# Patient Record
Sex: FEMALE | Race: WHITE | NOT HISPANIC OR LATINO | Employment: FULL TIME | URBAN - METROPOLITAN AREA
[De-identification: names, ages, dates, MRNs, and addresses within clinical notes are randomized per-mention and may not be internally consistent; named-entity substitution may affect disease eponyms.]

---

## 2017-05-21 PROCEDURE — 96372 THER/PROPH/DIAG INJ SC/IM: CPT

## 2017-05-21 PROCEDURE — 99283 EMERGENCY DEPT VISIT LOW MDM: CPT | Mod: 25

## 2017-05-22 ENCOUNTER — HOSPITAL ENCOUNTER (EMERGENCY)
Facility: HOSPITAL | Age: 47
Discharge: HOME | End: 2017-05-22
Attending: FAMILY MEDICINE
Payer: COMMERCIAL

## 2017-05-22 VITALS
TEMPERATURE: 98 F | OXYGEN SATURATION: 99 % | DIASTOLIC BLOOD PRESSURE: 70 MMHG | HEART RATE: 78 BPM | RESPIRATION RATE: 17 BRPM | WEIGHT: 136 LBS | BODY MASS INDEX: 25.7 KG/M2 | SYSTOLIC BLOOD PRESSURE: 128 MMHG

## 2017-05-22 DIAGNOSIS — D84.1 ANGIOEDEMA, HEREDITARY: Primary | ICD-10-CM

## 2017-05-22 PROCEDURE — 25000003 PHARM REV CODE 250: Performed by: FAMILY MEDICINE

## 2017-05-22 PROCEDURE — 63600175 PHARM REV CODE 636 W HCPCS: Performed by: FAMILY MEDICINE

## 2017-05-22 RX ORDER — PREDNISONE 20 MG/1
40 TABLET ORAL DAILY
Qty: 10 TABLET | Refills: 0 | Status: SHIPPED | OUTPATIENT
Start: 2017-05-22 | End: 2017-05-27

## 2017-05-22 RX ORDER — DIPHENHYDRAMINE HYDROCHLORIDE 50 MG/ML
25 INJECTION INTRAMUSCULAR; INTRAVENOUS
Status: COMPLETED | OUTPATIENT
Start: 2017-05-22 | End: 2017-05-22

## 2017-05-22 RX ADMIN — METHYLPREDNISOLONE SODIUM SUCCINATE 125 MG: 125 INJECTION, POWDER, FOR SOLUTION INTRAMUSCULAR; INTRAVENOUS at 12:05

## 2017-05-22 RX ADMIN — DIPHENHYDRAMINE HYDROCHLORIDE 25 MG: 50 INJECTION, SOLUTION INTRAMUSCULAR; INTRAVENOUS at 12:05

## 2017-05-22 RX ADMIN — RANITIDINE 150 MG: 15 SYRUP ORAL at 12:05

## 2017-05-22 NOTE — PROVIDER PROGRESS NOTES - EMERGENCY DEPT.
Encounter Date: 5/21/2017    ED Physician Progress Notes           Patient is currently resting comfortably.  No increase in symptoms.  No significant decrease in symptoms.

## 2017-05-22 NOTE — PROVIDER PROGRESS NOTES - EMERGENCY DEPT.
Encounter Date: 5/21/2017    ED Physician Progress Notes           Patient started to feel somewhat better.  No shortness of breath.  No increased swelling at all.  She would like to go home and rest and follow up with an immunologist.

## 2017-05-22 NOTE — ED PROVIDER NOTES
Encounter Date: 2017       History     Chief Complaint   Patient presents with    Oral Swelling     Review of patient's allergies indicates:   Allergen Reactions    Bextra [valdecoxib]     Latex, natural rubber      The history is provided by the patient. No  was used.   Allergic Reaction   The primary symptoms are  angioedema. The primary symptoms do not include wheezing, shortness of breath, cough, abdominal pain, nausea, vomiting, diarrhea, dizziness, palpitations, altered mental status, rash or urticaria. The current episode started several hours ago. The problem has been gradually worsening.   The angioedema began 3 - 5 hours ago. The angioedema has been gradually worsening since its onset. It is located on the tongue. The angioedema is not associated with shortness of breath or stridor.       Patient has had idiopathic angioedema for the past 4 years.  She was fine until tonight began to notice some swelling of her tongue and upper neck.  No shortness of breath nausea or vomiting.  No fever or chills.  It does hurt to swallow but no difficulty swallowing.  She has been seen by multiple physicians treated with multiple medications with minimal effect.  The wound can find a cause of her idiopathic angioedema.  She occasionally gets uvula swelling as well as her lips and face.  [place] she took 50 mg of Benadryl tonight.    Past Medical History:   Diagnosis Date    Abnormal Pap smear     Pt unknown of results.      Past Surgical History:   Procedure Laterality Date    BREAST RECONSTRUCTION      Augmentation    CERVICAL BIOPSY  W/ LOOP ELECTRODE EXCISION      Pt unknown of results.     SECTION      x2    CHOLECYSTECTOMY  2012    CRANIOTOMY      FOOT SURGERY      Right    TUBAL LIGATION       Family History   Problem Relation Age of Onset    Cancer Maternal Grandmother 70     Stomach, Throat & Brain    Cancer Maternal Grandfather 83     Renal Cancer     Cancer Father 58     Lung Cancer    Cancer Mother 29     Uterine Cancer    Ovarian cancer Neg Hx     Colon cancer Neg Hx     Breast cancer Neg Hx      Social History   Substance Use Topics    Smoking status: Never Smoker    Smokeless tobacco: Never Used    Alcohol use Yes      Comment: Socially     Review of Systems   Respiratory: Negative for cough, shortness of breath, wheezing and stridor.    Cardiovascular: Negative for palpitations.   Gastrointestinal: Negative for abdominal pain, diarrhea, nausea and vomiting.   Skin: Negative for rash.   Neurological: Negative for dizziness.       Physical Exam     Initial Vitals [05/21/17 2357]   BP Pulse Resp Temp SpO2   137/70 88 17 98.6 °F (37 °C) 99 %     Physical Exam    Nursing note and vitals reviewed.  Constitutional: She appears well-developed and well-nourished.   Patient in no acute distress.   HENT:   Head: Normocephalic and atraumatic.   Right Ear: External ear normal.   Left Ear: External ear normal.   Nose: Nose normal.   Mouth/Throat: Oropharynx is clear and moist.   No obvious swelling of her tongue.  No swelling of her uvula or posterior pharynx.   Eyes: Conjunctivae and EOM are normal. Pupils are equal, round, and reactive to light.   Neck: Normal range of motion. Neck supple.   Supple no swelling noted.   Cardiovascular: Normal rate, regular rhythm and normal heart sounds.   Pulmonary/Chest: Breath sounds normal.   Abdominal: Soft. Bowel sounds are normal.   Musculoskeletal: Normal range of motion.   Neurological: She is alert and oriented to person, place, and time. She has normal strength.   Skin: Skin is warm and dry.   Psychiatric: She has a normal mood and affect.         ED Course   Procedures  Labs Reviewed - No data to display                            ED Course     Clinical Impression:   The encounter diagnosis was Angioedema, hereditary.          Javier Keller MD  05/22/17 0155

## 2017-05-22 NOTE — ED TRIAGE NOTES
Patient c/o tongue swelling that is spreading to her throat. Moderate amount of swelling noted to tongue. Patient took Benadryl at home.

## 2017-06-07 ENCOUNTER — OFFICE VISIT (OUTPATIENT)
Dept: ALLERGY | Facility: CLINIC | Age: 47
End: 2017-06-07
Payer: COMMERCIAL

## 2017-06-07 VITALS
BODY MASS INDEX: 27.14 KG/M2 | SYSTOLIC BLOOD PRESSURE: 120 MMHG | HEIGHT: 61 IN | HEART RATE: 88 BPM | DIASTOLIC BLOOD PRESSURE: 70 MMHG | WEIGHT: 143.75 LBS

## 2017-06-07 DIAGNOSIS — R10.9 ABDOMINAL PAIN, UNSPECIFIED LOCATION: ICD-10-CM

## 2017-06-07 DIAGNOSIS — T78.3XXA ANGIOEDEMA, INITIAL ENCOUNTER: Primary | ICD-10-CM

## 2017-06-07 DIAGNOSIS — M79.7 FIBROMYALGIA: ICD-10-CM

## 2017-06-07 DIAGNOSIS — K21.9 GASTROESOPHAGEAL REFLUX DISEASE, ESOPHAGITIS PRESENCE NOT SPECIFIED: ICD-10-CM

## 2017-06-07 DIAGNOSIS — G43.909 MIGRAINE WITHOUT STATUS MIGRAINOSUS, NOT INTRACTABLE, UNSPECIFIED MIGRAINE TYPE: ICD-10-CM

## 2017-06-07 PROCEDURE — 99244 OFF/OP CNSLTJ NEW/EST MOD 40: CPT | Mod: S$GLB,,, | Performed by: ALLERGY & IMMUNOLOGY

## 2017-06-07 PROCEDURE — 99999 PR PBB SHADOW E&M-EST. PATIENT-LVL III: CPT | Mod: PBBFAC,,, | Performed by: ALLERGY & IMMUNOLOGY

## 2017-06-07 NOTE — LETTER
June 9, 2017      [place]  [address]  [address]  [place] [address]           [facility] - Allergy/ Immunology  [address]  [place] [address]  Phone: [phone]  Fax: [phone]          Patient: Sagrario Almonte   MR Number: [id number]   YOB: 1970   Date of Visit: 6/7/2017       Dear Magdalena Murillo:    Thank you for referring Sagrario Almonte to me for evaluation. Attached you will find relevant portions of my assessment and plan of care.    If you have questions, please do not hesitate to call me. I look forward to following Sagrario Almonte along with you.    Sincerely,    LIEN Perdomo III, MD    Enclosure  CC:  No Recipients    If you would like to receive this communication electronically, please contact [email or web address] or [phone] to request more information on [employer] Link access.    For providers and/or their staff who would like to refer a patient to [facility], please contact us through our one-stop-shop provider referral line, [facility], at [phone].    If you feel you have received this communication in error or would no longer like to receive these types of communications, please e-mail [email or web address]

## 2017-06-09 NOTE — PROGRESS NOTES
"Sagrario Almonte is referred by LORE Murillo for a consult regarding chronic recurrent angioedema. She is here alone. Her primary care physician is LORE Pratt.    She has had recurrent angioedema for the past 4 years. She may have angioedema of the lips, eyelids, tongue, uvula, and throat. When she has this she will take up to 7 Benadryl a day, Tagamet twice a day, and Zyrtec twice a day. The episodes usually resolve within 24-48 hours. She does not take any preventative daily medication.    She estimates that she has had over 100 episodes and 15 in 2017.    She does have an EpiPen and has used this twice in the past. The last was 3 years ago.    She has been followed in the past by Dr. Shahram Escobar, an allergist in [place]. Evaluation with him was negative. She does remember that her C1 esterase inhibitor level was normal.    She has never had any urticaria.    She denies any thyroid disease.    There is no family history of angioedema.    There is no association with any food, contact, or ingestion.    She has had recurrent abdominal pain and seen 2 gastroenterologists in the past in [place] with a negative evaluation. She describes the pain as her abdomen being "full of rocks". She does have recurrent nausea and diarrhea frequently.     She does have gastroesophageal reflux disease and has been diagnosed with esophagitis in the past.    She denies any rhinitis or conjunctivitis. She denies any cough, wheezing, or shortness of breath. She denies any history of asthma.    For ROS, FH, SH please see Allergy and Asthma Questionnaire dated today.    Some relevant pertinent positives:    Review of Systems:  She had a right internal carotid aneurysm removed in September 2006. She does have a history of migraine headaches. She has recurrent vertigo and was diagnosed with Ménière's disease by an ENT in [place]. She takes an occasional diuretic when needed. She has a history of fibromyalgia and diffuse muscle pain. She " does occasionally have palpitations.    Family History: Negative for angioedema. Her mother has rhinitis that is seasonal.    Home environment: She has lived in the same house for the past 6 months. There was no water damage. There is no evidence of mold. There is no carpeting in the bedroom. There are no pets.    Social History: She is a nonsmoker. She works as a .    Physical Examination:  General: Well-developed, well-nourished, no acute distress.  Head: No sinus tenderness.  Eyes: Conjunctivae:  No bulbar or palpebral conjunctival injection.  Ears: EAC's clear.  TM's clear.  No pre-auricular nodes.  Nose: Nasal Mucosa:  Pink.  Septum: No apparent deviation.  Turbinates:  No significant edema.  Polyps/Mass:  None visible.  Teeth/Gums:  No bleeding noted.  Oropharynx: No exudates.  Neck: Supple without thyromegaly. No cervical lymphadenopathy.    Respiratory/Chest: Effort: Good.  Auscultation:  Clear bilaterally.  Cardiovascular:  No murmur, rubs, or gallop heard.   GI:  Non-tender.  No masses.  No organomegaly.  Extremities:  No swelling.  No cyanosis, clubbing, or edema.  Skin: Good turgor.  No urticaria or angioedema.  Neuro/Psych: Oriented x 3.    Assessment:  1. Recurrent angioedema without urticaria of uncertain etiology.  2. Recurrent abdominal pain of uncertain etiology.  3. GERD.  4. Recurrent nausea and diarrhea of uncertain etiology.  5. Fibromyalgia.  6. Migraine headaches.  7. S/P right internal carotid aneurysm repair September 2006.    Recommendations:  1. Laboratory as ordered.  2. Obtain records from Dr. Escobar.  3. Consider food skin testing. She will hold antihistamines 3 days prior.  4. Journal and take pictures of all episodes.  5. EpiPen.  6. Cetirizine 10 mg twice a day for now.  7. Return to clinic in 1-2 weeks.

## 2017-06-22 ENCOUNTER — OFFICE VISIT (OUTPATIENT)
Dept: ALLERGY | Facility: CLINIC | Age: 47
End: 2017-06-22
Payer: COMMERCIAL

## 2017-06-22 VITALS
DIASTOLIC BLOOD PRESSURE: 80 MMHG | WEIGHT: 145.06 LBS | RESPIRATION RATE: 18 BRPM | SYSTOLIC BLOOD PRESSURE: 118 MMHG | BODY MASS INDEX: 27.39 KG/M2 | HEART RATE: 82 BPM | TEMPERATURE: 98 F | HEIGHT: 61 IN

## 2017-06-22 DIAGNOSIS — T78.3XXD ANGIOEDEMA, SUBSEQUENT ENCOUNTER: Primary | ICD-10-CM

## 2017-06-22 DIAGNOSIS — K21.9 GASTROESOPHAGEAL REFLUX DISEASE, ESOPHAGITIS PRESENCE NOT SPECIFIED: ICD-10-CM

## 2017-06-22 PROCEDURE — 99214 OFFICE O/P EST MOD 30 MIN: CPT | Mod: 25,S$GLB,, | Performed by: ALLERGY & IMMUNOLOGY

## 2017-06-22 PROCEDURE — 95004 PERQ TESTS W/ALRGNC XTRCS: CPT | Mod: S$GLB,,, | Performed by: ALLERGY & IMMUNOLOGY

## 2017-06-22 PROCEDURE — 99999 PR PBB SHADOW E&M-EST. PATIENT-LVL III: CPT | Mod: PBBFAC,,, | Performed by: ALLERGY & IMMUNOLOGY

## 2017-06-22 RX ORDER — DEXLANSOPRAZOLE 60 MG/1
60 CAPSULE, DELAYED RELEASE ORAL DAILY
Refills: 6 | COMMUNITY
Start: 2017-05-05

## 2017-06-22 RX ORDER — HYDROXYZINE HYDROCHLORIDE 10 MG/1
10 TABLET, FILM COATED ORAL 3 TIMES DAILY PRN
Qty: 90 TABLET | Refills: 2 | Status: SHIPPED | OUTPATIENT
Start: 2017-06-22 | End: 2018-05-23 | Stop reason: SDUPTHER

## 2017-06-22 RX ORDER — PREDNISONE 10 MG/1
TABLET ORAL
Qty: 21 TABLET | Refills: 0 | Status: SHIPPED | OUTPATIENT
Start: 2017-06-22 | End: 2020-07-27

## 2017-06-22 NOTE — PROGRESS NOTES
Sagrario Almonte returns to clinic today for continued evaluation of chronic recurrent angioedema. She is here alone. She was last seen June 7, 2017.    Since her last visit, she has not had any angioedema.    She has not had any urticaria.    She did have some mild tingling on the right side of her face today. She has not had any swelling.    She has had about 15 episodes of angioedema this year in 2017.    There has been no association with any food, contact, or ingestion.    She takes antihistamine after the event begins. She has not been taking them prophylactically.    She has not had any abdominal pain.    [facility] PEQ ALLERGY QUESTIONNAIRE SHORT 6/22/2017   Are you taking any new medications since your last visit? No   Constitution: No symptoms   Head or facial pain: Headaches   Eyes: Sensitivity to light   Ears: No symptoms   Nose: No symptoms   Throat: Itching   Sinuses: No symptoms   Lungs: No symptoms   Skin: No symptoms   Cardiovascular: No symptoms   Gastrointestinal: No symptoms   Genital/ urinary No symptoms   Musculoskeletal: Muscle pain, Joint pain   Neurologic: Headaches   Endocrine: No symptoms   Hematologic: Bruises/ bleeds easily     Physical Examination:  General: Well-developed, well-nourished, no acute distress.  Head: No sinus tenderness.  Eyes: Conjunctivae:  No bulbar or palpebral conjunctival injection.  Ears: EAC's clear.  TM's clear.  No pre-auricular nodes.  Nose: Nasal Mucosa:  Pink.  Septum: No apparent deviation.  Turbinates:  No significant edema.  Polyps/Mass:  None visible.  Teeth/Gums:  No bleeding noted.  Oropharynx: No exudates.  Neck: Supple without thyromegaly. No cervical lymphadenopathy.    Respiratory/Chest: Effort: Good.  Auscultation:  Clear bilaterally.  Skin: Good turgor.  No urticaria or angioedema.  Neuro/Psych: Oriented x 3.    Laboratory 6/7/2017:  ImmunoCAP: Negative.  C1 esterase inhibitor level: 25.  C1 esterase inhibitor functional: Greater than 90.  C3: 112.  C4:  24.  TSH: 1.194.  Thyroid peroxidase antibody level: Less than 6.0.  Thyroglobulin antibody level: Less than 4.0.  Vitamin D level: 36.  Anti-IgE receptor antibody level: 1.  Serum tryptase: 3.0.  CBC: Normal.  CMP: Glucose 55.  SPEP: Normal.    Food skin tests prick #60 6/22/2017:  3+ histamine control. All tests are negative.    Assessment:  1. Chronic recurrent urticaria and angioedema, probably idiopathic.  2. Recurrent abdominal pain of uncertain etiology, resolved.  3. GERD.  4. Recurrent nausea and diarrhea of uncertain etiology.  5. Fibromyalgia.  6. Migraine headaches.  7. S/P right internal carotid aneurysm repair September 2006.    Recommendations:  1. Cetirizine 10 mg twice a day.  2. Add hydroxyzine 10 mg at night. May increase this to 50 mg at night until controlled.  3. Prednisone if needed.  4. Consider Xolair.  5. Return to clinic in 3 weeks or sooner if needed.    Allergic mechanisms and treatment options were reviewed in detail. Urticaria and angioedema were reviewed.

## 2017-08-16 PROBLEM — E04.1 THYROID NODULE: Status: ACTIVE | Noted: 2017-08-16

## 2018-05-09 ENCOUNTER — HOSPITAL ENCOUNTER (EMERGENCY)
Facility: HOSPITAL | Age: 48
Discharge: HOME | End: 2018-05-10
Attending: SURGERY
Payer: COMMERCIAL

## 2018-05-09 VITALS
RESPIRATION RATE: 16 BRPM | TEMPERATURE: 97 F | HEART RATE: 81 BPM | DIASTOLIC BLOOD PRESSURE: 81 MMHG | HEIGHT: 61 IN | SYSTOLIC BLOOD PRESSURE: 152 MMHG | WEIGHT: 145 LBS | BODY MASS INDEX: 27.38 KG/M2 | OXYGEN SATURATION: 99 %

## 2018-05-09 DIAGNOSIS — G89.29 CHRONIC NONINTRACTABLE HEADACHE, UNSPECIFIED HEADACHE TYPE: Primary | ICD-10-CM

## 2018-05-09 DIAGNOSIS — R51.9 CHRONIC NONINTRACTABLE HEADACHE, UNSPECIFIED HEADACHE TYPE: Primary | ICD-10-CM

## 2018-05-09 PROCEDURE — 96372 THER/PROPH/DIAG INJ SC/IM: CPT

## 2018-05-09 PROCEDURE — 63600175 PHARM REV CODE 636 W HCPCS: Performed by: SURGERY

## 2018-05-09 PROCEDURE — 99283 EMERGENCY DEPT VISIT LOW MDM: CPT | Mod: 25

## 2018-05-09 RX ORDER — HYDROMORPHONE HYDROCHLORIDE 2 MG/ML
2 INJECTION, SOLUTION INTRAMUSCULAR; INTRAVENOUS; SUBCUTANEOUS
Status: COMPLETED | OUTPATIENT
Start: 2018-05-09 | End: 2018-05-09

## 2018-05-09 RX ORDER — ONDANSETRON 2 MG/ML
4 INJECTION INTRAMUSCULAR; INTRAVENOUS
Status: COMPLETED | OUTPATIENT
Start: 2018-05-09 | End: 2018-05-09

## 2018-05-09 RX ADMIN — HYDROMORPHONE HYDROCHLORIDE 2 MG: 2 INJECTION INTRAMUSCULAR; INTRAVENOUS; SUBCUTANEOUS at 11:05

## 2018-05-09 RX ADMIN — ONDANSETRON 4 MG: 2 INJECTION INTRAMUSCULAR; INTRAVENOUS at 11:05

## 2018-05-10 NOTE — ED TRIAGE NOTES
47 y.o. female presents to ER ED 02/ED 02A   Chief Complaint   Patient presents with    Headache     states having a migraine not relieved by imitrex at home   . No acute distress noted.

## 2018-05-10 NOTE — ED PROVIDER NOTES
[facility]                                                 Chief Complaint  47 y.o. female with Headache (states having a migraine not relieved by imitrex at home)    History of Present Illness  Sagrario Almonte presents to the emergency room with migraine headache this afternoon  Extensive history was taken tonight in the emergency room by the ER MCITLALIL. on HA Hx  States she's had long-standing migraine headaches, previously treated by her PCP  Patient states she recently reestablished care with a local neurologist Jose A Richmond  Patient states that she has had headaches on a daily basis for years, worse lately  Has recently gotten several tests and imaging by her neurologist for the headaches  Patient is alert and appropriate, no nausea vomiting, no fever or photophobia in the ER  Patient states her dull frontal 7/10 headache is typical of her daily migraine headaches    The history is provided by the patient  Medical history: Abnormal Pap smear  Surgeries: Breast reconstruction, cervical biopsy, , cholecystectomy, craniotomy, foot surgery, BTL  ALLERGIES: Adhesive and Bextra    Review of Systems and Physical Exam      Review of Systems  -- Constitution - no fever, denies fatigue, no weakness, no chills  -- Eyes - no tearing or redness, no visual disturbance  -- Ear, Nose - no tinnitus or earache, no nasal congestion or discharge  -- Mouth,Throat - no sore throat, no toothache, normal voice, normal swallowing  -- Respiratory - denies cough and congestion, no shortness of breath, no BRAR  -- Cardiovascular - denies chest pain, no palpitations, denies claudication  -- Gastrointestinal - denies abdominal pain, nausea, vomiting, or diarrhea  -- Genitourinary - no dysuria, no denies flank pain, no hematuria or frequency   -- Musculoskeletal - denies back pain, negative for myalgias and arthralgias   -- Neurological - headache, denies weakness or seizure; no LOC  -- Skin - denies pallor, rash, or  changes in skin. no hives or welts noted    BP (!) 152/81  Pulse 81   Temp 97 °F (36.1 °C) (Tympanic)   Resp 16     Physical Exam  -- Nursing note and vitals reviewed  -- Constitutional: Appears well-developed and well-nourished  -- Head: Atraumatic. Normocephalic. No obvious abnormality  -- Eyes: Pupils are equal and reactive to light. Normal conjunctiva and lids  -- Nose: Nose normal in appearance, nares grossly normal. No discharge  -- Throat: Mucous membranes moist, pharynx normal, normal tonsils. No lesions   -- Ears: External ears and TM normal bilaterally. Normal hearing and no drainage  -- Neck: Normal range of motion. Neck supple. No masses, trachea midline  -- Cardiac: Normal rate, regular rhythm and normal heart sounds  -- Pulmonary: Normal respiratory effort, breath sounds clear to auscultation  -- Abdominal: Soft, no tenderness. Normal bowel sounds. Normal liver edge  -- Musculoskeletal: Normal range of motion, no effusions. Joints stable   -- Neurological: No focal deficits. Showed good interaction with staff  -- Vascular: Posterior tibial, dorsalis pedis and radial pulses 2+ bilaterally      Emergency Room Course      Treatment and Evaluation  -- IM 2 mg Dilaudid given today in the ER  -- IM 4 mg Zofran given today in the ER      Diagnosis  -- Chronic nonintractable headache, unspecified headache type.    Disposition and Plan  -- Disposition: home  -- Condition: stable  -- Follow-up: Patient to follow up with Neurology in 1-2 days.  -- I advised the patient that we have found no life threatening condition today  -- At this time, I believe the patient is clinically stable for discharge.   -- The patient acknowledges that close follow up with a MD is required   -- Patient agrees to comply with all instruction and direction given in the ER    This note is dictated on Dragon Natural Speaking word recognition program.  There are word recognition mistakes that are occasionally missed on review.             Yusuf Munson MD  05/09/18 6848

## 2018-05-24 RX ORDER — HYDROXYZINE HYDROCHLORIDE 10 MG/1
10 TABLET, FILM COATED ORAL 3 TIMES DAILY PRN
Qty: 90 TABLET | Refills: 2 | Status: SHIPPED | OUTPATIENT
Start: 2018-05-24 | End: 2020-08-27 | Stop reason: SDUPTHER

## 2019-06-06 RX ORDER — HYDROXYZINE HYDROCHLORIDE 10 MG/1
10 TABLET, FILM COATED ORAL 3 TIMES DAILY PRN
Qty: 90 TABLET | Refills: 2 | OUTPATIENT
Start: 2019-06-06

## 2019-07-09 ENCOUNTER — HOSPITAL ENCOUNTER (OUTPATIENT)
Dept: RADIOLOGY | Facility: HOSPITAL | Age: 49
Discharge: HOME | End: 2019-07-09
Attending: NURSE PRACTITIONER
Payer: COMMERCIAL

## 2019-07-09 DIAGNOSIS — M79.672 PAIN IN LEFT FOOT: ICD-10-CM

## 2019-07-09 DIAGNOSIS — M79.672 PAIN IN LEFT FOOT: Primary | ICD-10-CM

## 2019-07-09 PROCEDURE — 73630 X-RAY EXAM OF FOOT: CPT | Mod: TC,LT

## 2019-12-18 ENCOUNTER — HOSPITAL ENCOUNTER (OUTPATIENT)
Dept: RADIOLOGY | Facility: HOSPITAL | Age: 49
Discharge: HOME | End: 2019-12-18
Attending: CHIROPRACTOR
Payer: COMMERCIAL

## 2019-12-18 DIAGNOSIS — M54.50 LOW BACK PAIN: ICD-10-CM

## 2019-12-18 PROCEDURE — 72148 MRI LUMBAR SPINE WITHOUT CONTRAST: ICD-10-PCS | Mod: 26,,, | Performed by: RADIOLOGY

## 2019-12-18 PROCEDURE — 72148 MRI LUMBAR SPINE W/O DYE: CPT | Mod: 26,,, | Performed by: RADIOLOGY

## 2019-12-18 PROCEDURE — 72148 MRI LUMBAR SPINE W/O DYE: CPT | Mod: TC

## 2020-01-23 ENCOUNTER — OFFICE VISIT (OUTPATIENT)
Dept: OBSTETRICS AND GYNECOLOGY | Facility: CLINIC | Age: 50
End: 2020-01-23
Payer: COMMERCIAL

## 2020-01-23 VITALS
BODY MASS INDEX: 30.06 KG/M2 | DIASTOLIC BLOOD PRESSURE: 82 MMHG | RESPIRATION RATE: 18 BRPM | HEIGHT: 61 IN | WEIGHT: 159.19 LBS | SYSTOLIC BLOOD PRESSURE: 118 MMHG | HEART RATE: 87 BPM

## 2020-01-23 DIAGNOSIS — Z12.31 ENCOUNTER FOR SCREENING MAMMOGRAM FOR BREAST CANCER: ICD-10-CM

## 2020-01-23 DIAGNOSIS — Z12.4 CERVICAL CANCER SCREENING: ICD-10-CM

## 2020-01-23 DIAGNOSIS — Z01.419 ENCOUNTER FOR GYNECOLOGICAL EXAMINATION WITHOUT ABNORMAL FINDING: Primary | ICD-10-CM

## 2020-01-23 PROCEDURE — 99386 PREV VISIT NEW AGE 40-64: CPT | Mod: S$GLB,,, | Performed by: OBSTETRICS & GYNECOLOGY

## 2020-01-23 PROCEDURE — 99999 PR PBB SHADOW E&M-EST. PATIENT-LVL III: CPT | Mod: PBBFAC,,, | Performed by: OBSTETRICS & GYNECOLOGY

## 2020-01-23 PROCEDURE — 99386 PR PREVENTIVE VISIT,NEW,40-64: ICD-10-PCS | Mod: S$GLB,,, | Performed by: OBSTETRICS & GYNECOLOGY

## 2020-01-23 PROCEDURE — 88175 CYTOPATH C/V AUTO FLUID REDO: CPT

## 2020-01-23 PROCEDURE — 99999 PR PBB SHADOW E&M-EST. PATIENT-LVL III: ICD-10-PCS | Mod: PBBFAC,,, | Performed by: OBSTETRICS & GYNECOLOGY

## 2020-01-23 RX ORDER — PROPRANOLOL HYDROCHLORIDE 10 MG/1
TABLET ORAL
COMMUNITY
End: 2023-06-22

## 2020-01-23 RX ORDER — EPINEPHRINE 0.15 MG/.3ML
0.15 INJECTION INTRAMUSCULAR
COMMUNITY
End: 2021-01-02

## 2020-01-23 RX ORDER — OXYBUTYNIN CHLORIDE 5 MG/1
5 TABLET ORAL 2 TIMES DAILY
Qty: 60 TABLET | Refills: 11 | Status: SHIPPED | OUTPATIENT
Start: 2020-01-23 | End: 2021-12-10

## 2020-01-23 RX ORDER — PHENTERMINE HYDROCHLORIDE 37.5 MG/1
TABLET ORAL
COMMUNITY
End: 2020-06-08

## 2020-01-23 RX ORDER — LEVOTHYROXINE SODIUM 25 UG/1
25 TABLET ORAL DAILY
COMMUNITY
Start: 2019-12-05

## 2020-01-23 NOTE — PROGRESS NOTES
Subjective:       Patient ID: Sagrario Almonte is a 49 y.o. female.    Chief Complaint:  Well Woman      History of Present Illness  Patient presents for annual exam.  Patient admits it is time for mammogram.  Patient admits to some urgency with incontinence of urine.  Counseling was done and patient like to trial of medication.  Patient has reported decreased libido.  She recently underwent back surgery and has been recovering.  She is otherwise without gyn complaints.    Menstrual History:  OB History        3    Para   3    Term   3            AB        Living   3       SAB        TAB        Ectopic        Multiple        Live Births   3                Menarche age:  Patient's last menstrual period was 2020 (approximate).         Review of Systems  Review of Systems   Constitutional: Positive for activity change. Negative for appetite change, chills, diaphoresis, fatigue, fever and unexpected weight change.   HENT: Positive for facial swelling. Negative for congestion, dental problem, drooling, ear discharge, ear pain, hearing loss, mouth sores, nosebleeds, postnasal drip, rhinorrhea, sinus pressure, sneezing, sore throat, tinnitus, trouble swallowing and voice change.    Eyes: Positive for photophobia and visual disturbance. Negative for pain, discharge, redness and itching.   Respiratory: Negative for apnea, cough, choking, chest tightness, shortness of breath, wheezing and stridor.    Cardiovascular: Positive for palpitations. Negative for chest pain and leg swelling.   Gastrointestinal: Negative for abdominal distention, abdominal pain, anal bleeding, blood in stool, constipation, diarrhea, nausea, rectal pain and vomiting.   Endocrine: Positive for cold intolerance. Negative for heat intolerance, polydipsia, polyphagia and polyuria.   Genitourinary: Positive for enuresis. Negative for decreased urine volume, difficulty urinating, dyspareunia, dysuria, flank pain, frequency, genital sores,  hematuria, menstrual problem, pelvic pain, urgency, vaginal bleeding, vaginal discharge and vaginal pain.   Musculoskeletal: Positive for arthralgias, back pain and myalgias. Negative for gait problem, joint swelling, neck pain and neck stiffness.   Skin: Negative for color change, pallor, rash and wound.   Allergic/Immunologic: Negative for environmental allergies, food allergies and immunocompromised state.   Neurological: Positive for headaches. Negative for dizziness, tremors, seizures, syncope, facial asymmetry, speech difficulty, weakness, light-headedness and numbness.   Hematological: Negative for adenopathy. Does not bruise/bleed easily.   Psychiatric/Behavioral: Negative for agitation, behavioral problems, confusion, decreased concentration, dysphoric mood, hallucinations, self-injury, sleep disturbance and suicidal ideas. The patient is not nervous/anxious and is not hyperactive.            Objective:      Physical Exam   Constitutional: She is oriented to person, place, and time. She appears well-developed and well-nourished.   Neck: No thyromegaly present.   Cardiovascular: Normal rate and regular rhythm.   Pulmonary/Chest: Effort normal and breath sounds normal. Right breast exhibits no inverted nipple, no mass, no nipple discharge, no skin change and no tenderness. Left breast exhibits no inverted nipple, no mass, no nipple discharge, no skin change and no tenderness. No breast tenderness or discharge. Breasts are symmetrical.   Abdominal: Soft. Bowel sounds are normal. She exhibits no mass. There is no tenderness. Hernia confirmed negative in the right inguinal area and confirmed negative in the left inguinal area.   Genitourinary: Vagina normal and uterus normal. Rectal exam shows no external hemorrhoid. No breast tenderness or discharge. Uterus is not enlarged and not tender. Cervix exhibits no motion tenderness, no discharge and no friability. Right adnexum displays no mass, no tenderness and no  fullness. Left adnexum displays no mass, no tenderness and no fullness. No tenderness in the vagina. No foreign body in the vagina. No vaginal discharge found.   Musculoskeletal: Normal range of motion.   Lymphadenopathy:        Right: No inguinal adenopathy present.        Left: No inguinal adenopathy present.   Neurological: She is alert and oriented to person, place, and time. She has normal reflexes.   Skin: Skin is dry.   Psychiatric: She has a normal mood and affect. Her behavior is normal. Judgment and thought content normal.   Nursing note and vitals reviewed.          Assessment:        1. Encounter for gynecological examination without abnormal finding    2. Encounter for screening mammogram for breast cancer    3. Cervical cancer screening        Decreased libido        Plan:         Sagrario was seen today for well woman.    Diagnoses and all orders for this visit:    Encounter for gynecological examination without abnormal finding    Encounter for screening mammogram for breast cancer  -     Mammo Digital Screening Bilat w/ Jairo; Future    Cervical cancer screening  -     Liquid-Based Pap Smear, Screening

## 2020-01-23 NOTE — LETTER
January 23, 2020      [place]  [address]  [facility] [address]           [facility] - OB/ GYN  [address] [address]  Phone: [phone]          Patient: Sagrario Almonte   MR Number: [id number]   YOB: 1970   Date of Visit: 1/23/2020       Dear Shania Quan:    Thank you for referring Sagrario Almonte to me for evaluation. Attached you will find relevant portions of my assessment and plan of care.    If you have questions, please do not hesitate to call me. I look forward to following Sagrario Almonte along with you.    Sincerely,    Venkatesh Lee MD    Enclosure  CC:  No Recipients    If you would like to receive this communication electronically, please contact [email or web address] or [phone] to request more information on [employer] Link access.    For providers and/or their staff who would like to refer a patient to [facility], please contact us through our one-stop-shop provider referral line, [facility], at [phone].    If you feel you have received this communication in error or would no longer like to receive these types of communications, please e-mail [email or web address]

## 2020-02-06 ENCOUNTER — HOSPITAL ENCOUNTER (OUTPATIENT)
Dept: RADIOLOGY | Facility: HOSPITAL | Age: 50
Discharge: HOME | End: 2020-02-06
Attending: OBSTETRICS & GYNECOLOGY
Payer: COMMERCIAL

## 2020-02-06 VITALS — BODY MASS INDEX: 30.02 KG/M2 | WEIGHT: 159 LBS | HEIGHT: 61 IN

## 2020-02-06 DIAGNOSIS — Z12.31 ENCOUNTER FOR SCREENING MAMMOGRAM FOR BREAST CANCER: ICD-10-CM

## 2020-02-06 PROCEDURE — 77067 SCR MAMMO BI INCL CAD: CPT | Mod: TC

## 2020-02-06 PROCEDURE — 77067 SCR MAMMO BI INCL CAD: CPT | Mod: 26,,, | Performed by: RADIOLOGY

## 2020-02-06 PROCEDURE — 77063 BREAST TOMOSYNTHESIS BI: CPT | Mod: 26,,, | Performed by: RADIOLOGY

## 2020-02-06 PROCEDURE — 77067 MAMMO DIGITAL SCREENING BILAT WITH TOMOSYNTHESIS_CAD: ICD-10-PCS | Mod: 26,,, | Performed by: RADIOLOGY

## 2020-02-06 PROCEDURE — 77063 MAMMO DIGITAL SCREENING BILAT WITH TOMOSYNTHESIS_CAD: ICD-10-PCS | Mod: 26,,, | Performed by: RADIOLOGY

## 2020-02-13 LAB
FINAL PATHOLOGIC DIAGNOSIS: NORMAL
Lab: NORMAL

## 2020-03-19 ENCOUNTER — TELEPHONE (OUTPATIENT)
Dept: OBSTETRICS AND GYNECOLOGY | Facility: CLINIC | Age: 50
End: 2020-03-19

## 2020-03-19 NOTE — TELEPHONE ENCOUNTER
----- Message from Kori Todd MA sent at 3/19/2020  1:30 PM CDT -----  Contact: self  Sagrario Almonte  MRN: [id number]  Home Phone      [phone]  Work Phone      Not on file.  Mobile          [phone]    Patient Care Team:  Yadi Pratt MD as PCP - General (Family Medicine)  OB? No  What phone number can you be reached at?  [phone]  Message:   Needs letter on [facility] letter head stating when she had her annual and mammo done this year for insurance reasons.

## 2020-06-08 ENCOUNTER — OFFICE VISIT (OUTPATIENT)
Dept: OBSTETRICS AND GYNECOLOGY | Facility: CLINIC | Age: 50
End: 2020-06-08
Payer: COMMERCIAL

## 2020-06-08 VITALS
HEIGHT: 61 IN | HEART RATE: 72 BPM | WEIGHT: 165 LBS | BODY MASS INDEX: 31.15 KG/M2 | DIASTOLIC BLOOD PRESSURE: 68 MMHG | SYSTOLIC BLOOD PRESSURE: 136 MMHG

## 2020-06-08 DIAGNOSIS — F32.9 REACTIVE DEPRESSION: Primary | ICD-10-CM

## 2020-06-08 PROCEDURE — 99213 PR OFFICE/OUTPT VISIT, EST, LEVL III, 20-29 MIN: ICD-10-PCS | Mod: S$GLB,,, | Performed by: OBSTETRICS & GYNECOLOGY

## 2020-06-08 PROCEDURE — 99999 PR PBB SHADOW E&M-EST. PATIENT-LVL III: CPT | Mod: PBBFAC,,, | Performed by: OBSTETRICS & GYNECOLOGY

## 2020-06-08 PROCEDURE — 99999 PR PBB SHADOW E&M-EST. PATIENT-LVL III: ICD-10-PCS | Mod: PBBFAC,,, | Performed by: OBSTETRICS & GYNECOLOGY

## 2020-06-08 PROCEDURE — 99213 OFFICE O/P EST LOW 20 MIN: CPT | Mod: S$GLB,,, | Performed by: OBSTETRICS & GYNECOLOGY

## 2020-06-08 RX ORDER — PHENTERMINE HYDROCHLORIDE 37.5 MG/1
37.5 TABLET ORAL
Qty: 30 TABLET | Refills: 1 | Status: SHIPPED | OUTPATIENT
Start: 2020-06-08 | End: 2020-06-08

## 2020-06-08 RX ORDER — CITALOPRAM 20 MG/1
20 TABLET, FILM COATED ORAL DAILY
Qty: 30 TABLET | Refills: 12 | Status: SHIPPED | OUTPATIENT
Start: 2020-06-08 | End: 2023-06-22

## 2020-06-08 NOTE — PROGRESS NOTES
Subjective:       Patient ID: Sagrario Almonte is a 49 y.o. female.    Chief Complaint:  Mood Swings (stopped taking medication that neurologist put her on, for appr 10 yrs. ) and Anxiety (crying, angry for no reason)      History of Present Illness  Patient presents complaining depression with some associated anxiety.  Patient admits to crying spells and difficulty sleeping.  Her appetite is decreased.  Patient states that symptoms be getting worse over the last few months.  Patient states she was on Medicine a little over 10 years ago which helped.  Patient is interested in restarting this medication.  Counseling was done.  Counseling lasted approximately 15 min period    Menstrual History:  OB History        3    Para   3    Term   3            AB        Living   3       SAB        TAB        Ectopic        Multiple        Live Births   3                Menarche age:  Patient's last menstrual period was 2020 (approximate).         Review of Systems  Review of Systems   Constitutional: Negative for activity change, appetite change, chills, diaphoresis, fatigue, fever and unexpected weight change.   HENT: Negative for congestion, dental problem, drooling, ear discharge, ear pain, facial swelling, hearing loss, mouth sores, nosebleeds, postnasal drip, rhinorrhea, sinus pressure, sneezing, sore throat, tinnitus, trouble swallowing and voice change.    Eyes: Negative for photophobia, pain, discharge, redness, itching and visual disturbance.   Respiratory: Negative for apnea, cough, choking, chest tightness, shortness of breath, wheezing and stridor.    Cardiovascular: Negative for chest pain, palpitations and leg swelling.   Gastrointestinal: Negative for abdominal distention, abdominal pain, anal bleeding, blood in stool, constipation, diarrhea, nausea, rectal pain and vomiting.   Endocrine: Negative for cold intolerance, heat intolerance, polydipsia, polyphagia and polyuria.   Genitourinary:  Negative for decreased urine volume, difficulty urinating, dyspareunia, dysuria, enuresis, flank pain, frequency, genital sores, hematuria, menstrual problem, pelvic pain, urgency, vaginal bleeding, vaginal discharge and vaginal pain.   Musculoskeletal: Negative for arthralgias, back pain, gait problem, joint swelling, myalgias, neck pain and neck stiffness.   Skin: Negative for color change, pallor, rash and wound.   Allergic/Immunologic: Negative for environmental allergies, food allergies and immunocompromised state.   Neurological: Negative for dizziness, tremors, seizures, syncope, facial asymmetry, speech difficulty, weakness, light-headedness, numbness and headaches.   Hematological: Negative for adenopathy. Does not bruise/bleed easily.   Psychiatric/Behavioral: Positive for agitation and sleep disturbance. Negative for behavioral problems, confusion, decreased concentration, dysphoric mood, hallucinations, self-injury and suicidal ideas. The patient is nervous/anxious. The patient is not hyperactive.            Objective:      Physical Exam   Psychiatric: Her speech is normal. Judgment normal. She is slowed. She exhibits a depressed mood. She expresses no homicidal and no suicidal ideation.   Nursing note and vitals reviewed.          Assessment:        1. Reactive depression                Plan:         Sagrario was seen today for mood swings and anxiety.    Diagnoses and all orders for this visit:    Reactive depression    Other orders  -     Discontinue: phentermine (ADIPEX-P) 37.5 mg tablet; Take 1 tablet (37.5 mg total) by mouth before breakfast.  -     citalopram (CELEXA) 20 MG tablet; Take 1 tablet (20 mg total) by mouth once daily.

## 2020-07-19 ENCOUNTER — HOSPITAL ENCOUNTER (EMERGENCY)
Facility: HOSPITAL | Age: 50
Discharge: HOME | End: 2020-07-19
Attending: SURGERY
Payer: COMMERCIAL

## 2020-07-19 VITALS
DIASTOLIC BLOOD PRESSURE: 77 MMHG | BODY MASS INDEX: 30.89 KG/M2 | WEIGHT: 163.5 LBS | TEMPERATURE: 98 F | SYSTOLIC BLOOD PRESSURE: 138 MMHG | OXYGEN SATURATION: 97 % | HEART RATE: 104 BPM | RESPIRATION RATE: 20 BRPM

## 2020-07-19 DIAGNOSIS — Z20.822 CLOSE EXPOSURE TO COVID-19 VIRUS: Primary | ICD-10-CM

## 2020-07-19 LAB — SARS-COV-2 RDRP RESP QL NAA+PROBE: NEGATIVE

## 2020-07-19 PROCEDURE — U0002 COVID-19 LAB TEST NON-CDC: HCPCS

## 2020-07-19 PROCEDURE — 99283 EMERGENCY DEPT VISIT LOW MDM: CPT

## 2020-07-19 RX ORDER — AZITHROMYCIN 250 MG/1
TABLET, FILM COATED ORAL
Qty: 6 TABLET | Refills: 0 | Status: SHIPPED | OUTPATIENT
Start: 2020-07-19 | End: 2023-06-22

## 2020-07-19 NOTE — Clinical Note
"Sagrario "Gerardo Almonte was seen and treated in our emergency department on 7/19/2020.     COVID-19 is present in our communities across the state. There is limited testing for COVID at this time, so not all patients can be tested. In this situation, your employee meets the following criteria:    Sagrario Almonte has met the criteria for COVID-19 testing and has a POSITIVE result. She can return to work once they are asymptomatic for 72 hours without the use of fever reducing medications AND at least ten days from the first positive result.     If you have any questions or concerns, or if I can be of further assistance, please do not hesitate to contact me.    Sincerely,              RN"

## 2020-07-19 NOTE — Clinical Note
"Sagrario Almonte (Anita) was seen and treated in our emergency department on 7/19/2020.     COVID-19 is present in our communities across the state. There is limited testing for COVID at this time, so not all patients can be tested. In this situation, your employee meets the following criteria:    Sagrario Almonte has met the criteria for COVID-19 testing and has a NEGATIVE result. The employee can return to work once they are asymptomatic for 72 hours without the use of fever reducing medications (Tylenol, Motrin, etc).     If you have any questions or concerns, or if I can be of further assistance, please do not hesitate to contact me.    Sincerely,             SOURAV Traylor RN RN"

## 2020-07-20 NOTE — ED TRIAGE NOTES
50 y.o. female presents to ER RWR 01/RWR 01   Chief Complaint   Patient presents with    COVID-19 Concerns     nausea, sneezing, headache X 2 weeks, Covid exposure   . No acute distress noted.

## 2020-07-20 NOTE — ED PROVIDER NOTES
[facility]                                                 Chief Complaint  50 y.o. female with COVID-19 Concerns (nausea, sneezing, headache X 2 weeks, Covid exposure)      History of Present Illness  Sagrario Almonte presents to the emergency room with COVID concerns today  Patient with clear nasal drainage with nasal mucosa erythema noted this a.m.  Pt states that they have been in contact with several people with COVID virus  Clear lung sounds with no wheezing or sputum identified on evaluation today  ROS in the ER today shows no signs or symptoms suspicious for coronavirus     The history is provided by the patient  Medical history: Abnormal Pap smear  Surgeries: Breast reconstruction, cervical biopsy, , cholecystectomy, craniotomy, foot surgery, BTL  ALLERGIES: Adhesive and Bextra    I have reviewed all of this patient's past medical, surgical, family, and social   histories as well as active allergies and medications documented in the  electronic medical record    Review of Systems and Physical Exam      Review of Systems  -- Constitution - no fever, denies fatigue, no weakness, no chills  -- Eyes - no tearing or redness, no visual disturbance  -- Ear, Nose - sneezing, nasal congestion and clear discharge   -- Mouth,Throat - sore throat, no toothache, normal voice, normal swallowing  -- Respiratory - cough and congestion, no shortness of breath, no BRAR  -- Cardiovascular - denies chest pain, no palpitations, denies claudication  -- Gastrointestinal - denies abdominal pain, nausea, vomiting, or diarrhea  -- Genitourinary - no dysuria, no hematuria, no flank pain, no bladder pain  -- Musculoskeletal - denies back pain, negative for trauma or injury  -- Neurological - no headache, denies weakness or seizure; no LOC  -- Skin - denies pallor, rash, or changes in skin. no hives or welts noted     Vital Signs  Her oral temperature is 98.4 °F (36.9 °C).   Her blood pressure is 138/77 and  her pulse is 104.   Her respiration is 20 and oxygen saturation is 97%.     Physical Exam  -- Nursing note and vitals reviewed  -- Constitutional: Appears well-developed and well-nourished  -- Head: Atraumatic. Normocephalic. No obvious abnormality  -- Eyes: Pupils are equal and reactive to light. Normal conjunctiva and lids  -- Nose: nasal mucosa erythema and edema; clear nasal discharge noted   -- Throat: post-nasal drip with mild posterior oropharnyx erythema  -- Ears: External ears and TM normal bilaterally. Normal hearing and no drainage  -- Neck: Normal range of motion. Neck supple. No masses, trachea midline  -- Cardiac: Normal rate, regular rhythm and normal heart sounds  -- Pulmonary: Normal respiratory effort, breath sounds clear to auscultation  -- Abdominal: Soft, no tenderness. Normal bowel sounds. Normal liver edge  -- Musculoskeletal: Normal range of motion, no effusions. Joints stable   -- Neurological: No focal deficits. Showed good interaction with staff  -- Vascular: Posterior tibial, dorsalis pedis and radial pulses 2+ bilaterally       Emergency Room Course      Treatment and Evaluation  -- rapid Coronavirus PCR was negative    Diagnosis  [Z20.828] Close Exposure to Covid-19 Virus (Primary)    Disposition and Plan  -- Disposition: home  -- Condition: stable  -- Follow-up: Patient to follow up with Yadi Pratt MD in 1-2 days.  -- I advised the patient that we have found no life threatening condition today  -- At this time, I believe the patient is clinically stable for discharge.   -- The patient acknowledges that close follow up with a MD is required   -- Patient agrees to comply with all instruction and direction given in the ER    This note is dictated on M*Modal word recognition program.  There are word recognition mistakes that are occasionally missed on review.         Yusuf Munson MD  07/19/20 2045

## 2020-07-27 ENCOUNTER — HOSPITAL ENCOUNTER (EMERGENCY)
Facility: HOSPITAL | Age: 50
Discharge: HOME | End: 2020-07-27
Attending: EMERGENCY MEDICINE
Payer: COMMERCIAL

## 2020-07-27 VITALS
RESPIRATION RATE: 18 BRPM | DIASTOLIC BLOOD PRESSURE: 92 MMHG | SYSTOLIC BLOOD PRESSURE: 135 MMHG | OXYGEN SATURATION: 100 % | TEMPERATURE: 99 F | WEIGHT: 164.81 LBS | HEART RATE: 82 BPM | BODY MASS INDEX: 31.14 KG/M2

## 2020-07-27 DIAGNOSIS — Z20.822 SUSPECTED COVID-19 VIRUS INFECTION: Primary | ICD-10-CM

## 2020-07-27 DIAGNOSIS — R05.8 PRODUCTIVE COUGH: ICD-10-CM

## 2020-07-27 LAB
ALBUMIN SERPL BCP-MCNC: 4.1 G/DL (ref 3.5–5.2)
ALP SERPL-CCNC: 70 U/L (ref 55–135)
ALT SERPL W/O P-5'-P-CCNC: 13 U/L (ref 10–44)
ANION GAP SERPL CALC-SCNC: 9 MMOL/L (ref 8–16)
AST SERPL-CCNC: 17 U/L (ref 10–40)
BASOPHILS # BLD AUTO: 0.03 K/UL (ref 0–0.2)
BASOPHILS NFR BLD: 0.5 % (ref 0–1.9)
BILIRUB SERPL-MCNC: 0.5 MG/DL (ref 0.1–1)
BNP SERPL-MCNC: <10 PG/ML (ref 0–99)
BUN SERPL-MCNC: 9 MG/DL (ref 6–20)
CALCIUM SERPL-MCNC: 9.2 MG/DL (ref 8.7–10.5)
CHLORIDE SERPL-SCNC: 106 MMOL/L (ref 95–110)
CK SERPL-CCNC: 77 U/L (ref 20–180)
CO2 SERPL-SCNC: 28 MMOL/L (ref 23–29)
CREAT SERPL-MCNC: 0.9 MG/DL (ref 0.5–1.4)
D DIMER PPP IA.FEU-MCNC: 0.42 MG/L FEU
DIFFERENTIAL METHOD: ABNORMAL
EOSINOPHIL # BLD AUTO: 0.1 K/UL (ref 0–0.5)
EOSINOPHIL NFR BLD: 1.7 % (ref 0–8)
ERYTHROCYTE [DISTWIDTH] IN BLOOD BY AUTOMATED COUNT: 12.8 % (ref 11.5–14.5)
EST. GFR  (AFRICAN AMERICAN): >60 ML/MIN/1.73 M^2
EST. GFR  (NON AFRICAN AMERICAN): >60 ML/MIN/1.73 M^2
GLUCOSE SERPL-MCNC: 76 MG/DL (ref 70–110)
HCT VFR BLD AUTO: 42.1 % (ref 37–48.5)
HGB BLD-MCNC: 13.8 G/DL (ref 12–16)
IMM GRANULOCYTES # BLD AUTO: 0.01 K/UL (ref 0–0.04)
IMM GRANULOCYTES NFR BLD AUTO: 0.2 % (ref 0–0.5)
LACTATE SERPL-SCNC: 1.4 MMOL/L (ref 0.5–2.2)
LDH SERPL L TO P-CCNC: 174 U/L (ref 110–260)
LYMPHOCYTES # BLD AUTO: 2.3 K/UL (ref 1–4.8)
LYMPHOCYTES NFR BLD: 35.6 % (ref 18–48)
MCH RBC QN AUTO: 31.4 PG (ref 27–31)
MCHC RBC AUTO-ENTMCNC: 32.8 G/DL (ref 32–36)
MCV RBC AUTO: 96 FL (ref 82–98)
MONOCYTES # BLD AUTO: 0.5 K/UL (ref 0.3–1)
MONOCYTES NFR BLD: 7.2 % (ref 4–15)
NEUTROPHILS # BLD AUTO: 3.5 K/UL (ref 1.8–7.7)
NEUTROPHILS NFR BLD: 54.8 % (ref 38–73)
NRBC BLD-RTO: 0 /100 WBC
PLATELET # BLD AUTO: 234 K/UL (ref 150–350)
PMV BLD AUTO: 11 FL (ref 9.2–12.9)
POTASSIUM SERPL-SCNC: 4.1 MMOL/L (ref 3.5–5.1)
PROCALCITONIN SERPL IA-MCNC: <0.02 NG/ML
PROT SERPL-MCNC: 7.1 G/DL (ref 6–8.4)
RBC # BLD AUTO: 4.39 M/UL (ref 4–5.4)
SARS-COV-2 RDRP RESP QL NAA+PROBE: NEGATIVE
SODIUM SERPL-SCNC: 143 MMOL/L (ref 136–145)
TROPONIN I SERPL DL<=0.01 NG/ML-MCNC: 0.02 NG/ML (ref 0–0.03)
WBC # BLD AUTO: 6.37 K/UL (ref 3.9–12.7)

## 2020-07-27 PROCEDURE — 84145 PROCALCITONIN (PCT): CPT

## 2020-07-27 PROCEDURE — 82550 ASSAY OF CK (CPK): CPT

## 2020-07-27 PROCEDURE — 93010 ELECTROCARDIOGRAM REPORT: CPT | Mod: ,,, | Performed by: INTERNAL MEDICINE

## 2020-07-27 PROCEDURE — 83880 ASSAY OF NATRIURETIC PEPTIDE: CPT

## 2020-07-27 PROCEDURE — 99285 EMERGENCY DEPT VISIT HI MDM: CPT | Mod: 25

## 2020-07-27 PROCEDURE — 87040 BLOOD CULTURE FOR BACTERIA: CPT

## 2020-07-27 PROCEDURE — 85379 FIBRIN DEGRADATION QUANT: CPT

## 2020-07-27 PROCEDURE — 83605 ASSAY OF LACTIC ACID: CPT

## 2020-07-27 PROCEDURE — 84484 ASSAY OF TROPONIN QUANT: CPT

## 2020-07-27 PROCEDURE — 82728 ASSAY OF FERRITIN: CPT

## 2020-07-27 PROCEDURE — 85025 COMPLETE CBC W/AUTO DIFF WBC: CPT

## 2020-07-27 PROCEDURE — 36415 COLL VENOUS BLD VENIPUNCTURE: CPT

## 2020-07-27 PROCEDURE — 93005 ELECTROCARDIOGRAM TRACING: CPT

## 2020-07-27 PROCEDURE — 83615 LACTATE (LD) (LDH) ENZYME: CPT

## 2020-07-27 PROCEDURE — 80053 COMPREHEN METABOLIC PANEL: CPT

## 2020-07-27 PROCEDURE — 86140 C-REACTIVE PROTEIN: CPT

## 2020-07-27 PROCEDURE — 93010 EKG 12-LEAD: ICD-10-PCS | Mod: ,,, | Performed by: INTERNAL MEDICINE

## 2020-07-27 PROCEDURE — U0002 COVID-19 LAB TEST NON-CDC: HCPCS

## 2020-07-27 RX ORDER — MONTELUKAST SODIUM 10 MG/1
10 TABLET ORAL NIGHTLY
Qty: 30 TABLET | Refills: 0 | Status: SHIPPED | OUTPATIENT
Start: 2020-07-27 | End: 2020-08-26

## 2020-07-27 RX ORDER — METHYLPREDNISOLONE 4 MG/1
TABLET ORAL
Qty: 1 PACKAGE | Refills: 0 | Status: SHIPPED | OUTPATIENT
Start: 2020-07-27 | End: 2020-08-17

## 2020-07-27 RX ORDER — LEVOFLOXACIN 500 MG/1
500 TABLET, FILM COATED ORAL DAILY
Qty: 5 TABLET | Refills: 0 | Status: SHIPPED | OUTPATIENT
Start: 2020-07-27 | End: 2020-08-01

## 2020-07-27 RX ORDER — ALBUTEROL SULFATE 90 UG/1
1-2 AEROSOL, METERED RESPIRATORY (INHALATION) EVERY 6 HOURS PRN
Qty: 6.7 G | Refills: 0 | Status: SHIPPED | OUTPATIENT
Start: 2020-07-27 | End: 2023-06-22

## 2020-07-27 RX ORDER — PROMETHAZINE HYDROCHLORIDE AND DEXTROMETHORPHAN HYDROBROMIDE 6.25; 15 MG/5ML; MG/5ML
5 SYRUP ORAL EVERY 6 HOURS PRN
Qty: 100 ML | Refills: 0 | Status: SHIPPED | OUTPATIENT
Start: 2020-07-27 | End: 2023-06-22

## 2020-07-27 NOTE — ED NOTES
Patient provided D/C instructions at the bedside.  Patient was provided the opportunity to review D/C summary and diagnosis.  Patient has no further questions or concerns in regards to treatment/care they have received today in the emergency department.  Patient acknowledged discharge teachings and follow-up appointment as directed. Patient educated on COVID-19 discharge instructions and importance of adhering to isolation as well as when to seek medical attention. Patient verbalizes understanding.   Patient had steady gait while exiting the emergency department and left with mask in use.

## 2020-07-27 NOTE — DISCHARGE INSTRUCTIONS
**Follow up with PCP in 24-48 hours. Via telemedicine Return to ER with worsening of symptoms.     **Over the counter tylenol or motrin as needed for pain and/or fever as directed on package insert. Drink plenty fluids. Get plenty rest. Wash hands frequently.     **Our goal in the emergency department is to always give you outstanding care and exceptional service. You may receive a survey by mail or e-mail in the next week regarding your experience in our ED. We would greatly appreciate your completing and returning the survey. Your feedback provides us with a way to recognize our staff who give very good care and it helps us learn how to improve when your experience was below our aspiration of excellence.

## 2020-07-27 NOTE — ED TRIAGE NOTES
50 y.o. female presents to ER   Chief Complaint   Patient presents with    COVID-19 Concerns     exposure to covid 3 positives in home, symptoms of cold chills, congestion   . No acute distress noted.

## 2020-07-27 NOTE — ED PROVIDER NOTES
Encounter Date: 2020       History     Chief Complaint   Patient presents with    COVID-19 Concerns     exposure to covid 3 positives in home, symptoms of cold chills, congestion     Sagrario Almonte is a 50 y.o. female with PMH of fibromyalgia, migraine, thyroid disease who presents to the ED with COVID-19 concerns.  Patient reports that 3 of her family members who live in the same household have recently test positive for COVID-19.  She presents with a 2 day history of generalized fatigue, nasal congestion, and cough.  Nonproductive, dry cough.  She denies associated chest pain or shortness of breath.  She does not endorse fever, chills, or body aches.  She was recently seen in the ED on  with a negative COVID test.  She presents requesting testing again due to symptoms and exposure.      The history is provided by the patient.     Review of patient's allergies indicates:   Allergen Reactions    Adhesive     Bextra [valdecoxib]      Past Medical History:   Diagnosis Date    Abnormal Pap smear     Pt unknown of results.     Abnormal Pap smear of cervix     Fibromyalgia     Migraines     Thyroid disease      Past Surgical History:   Procedure Laterality Date    BREAST RECONSTRUCTION      Augmentation    CERVICAL BIOPSY  W/ LOOP ELECTRODE EXCISION      Pt unknown of results.     SECTION      x2    CHOLECYSTECTOMY  2012    CRANIOTOMY      FOOT SURGERY      Right    TUBAL LIGATION       Family History   Problem Relation Age of Onset    Cancer Maternal Grandmother 70        Stomach, Throat & Brain    Cancer Maternal Grandfather 83        Renal Cancer    Cancer Father 58        Lung Cancer    Cancer Mother 29        Uterine Cancer    Ovarian cancer Neg Hx     Colon cancer Neg Hx     Breast cancer Neg Hx      Social History     Tobacco Use    Smoking status: Never Smoker    Smokeless tobacco: Never Used   Substance Use Topics    Alcohol use: Yes     Comment: Socially     Drug use: No     Review of Systems   Constitutional: Positive for fatigue. Negative for activity change, chills and fever.   HENT: Positive for congestion and postnasal drip. Negative for ear discharge, ear pain, sinus pressure, sinus pain and sore throat.    Eyes: Negative.    Respiratory: Positive for cough. Negative for chest tightness, shortness of breath and wheezing.    Cardiovascular: Negative.  Negative for chest pain.   Gastrointestinal: Negative.  Negative for abdominal distention, abdominal pain and nausea.   Endocrine: Negative.    Genitourinary: Negative.  Negative for dysuria, frequency and urgency.   Musculoskeletal: Negative.  Negative for back pain.   Skin: Negative.  Negative for rash.   Allergic/Immunologic: Negative.    Neurological: Negative.  Negative for dizziness, weakness, light-headedness and numbness.   Hematological: Negative.  Does not bruise/bleed easily.   Psychiatric/Behavioral: Negative.        Physical Exam     Initial Vitals [07/27/20 1242]   BP Pulse Resp Temp SpO2   133/78 92 18 96.7 °F (35.9 °C) 100 %      MAP       --         Physical Exam    Nursing note and vitals reviewed.  Constitutional: She appears well-developed and well-nourished.   HENT:   Head: Normocephalic and atraumatic.   Right Ear: Tympanic membrane, external ear and ear canal normal. Tympanic membrane is not erythematous. No middle ear effusion.   Left Ear: Tympanic membrane, external ear and ear canal normal. Tympanic membrane is not erythematous.  No middle ear effusion.   Nose: Mucosal edema and rhinorrhea present.   Mouth/Throat: Uvula is midline and mucous membranes are normal. Mucous membranes are not pale and not dry. Posterior oropharyngeal erythema (+ PND) present.   Eyes: Conjunctivae and EOM are normal. Pupils are equal, round, and reactive to light.   Neck: Normal range of motion. Neck supple.   Cardiovascular: Normal rate, regular rhythm, normal heart sounds and intact distal pulses.    Pulmonary/Chest: Effort normal and breath sounds normal. No bradypnea. No respiratory distress. She has no decreased breath sounds. She has no wheezes. She has no rhonchi. She has no rales.   Abdominal: Soft. Bowel sounds are normal. There is no abdominal tenderness.   Musculoskeletal: Normal range of motion.   Neurological: She is alert and oriented to person, place, and time. She has normal strength. She displays normal reflexes. No cranial nerve deficit or sensory deficit.   Skin: Skin is warm and dry. Capillary refill takes less than 2 seconds. No rash noted.   Psychiatric: She has a normal mood and affect. Her behavior is normal. Judgment and thought content normal.         ED Course   Procedures  Labs Reviewed   CBC W/ AUTO DIFFERENTIAL - Abnormal; Notable for the following components:       Result Value    Mean Corpuscular Hemoglobin 31.4 (*)     All other components within normal limits   CULTURE, BLOOD   CULTURE, BLOOD   COMPREHENSIVE METABOLIC PANEL   LACTATE DEHYDROGENASE   CK   LACTIC ACID, PLASMA   TROPONIN I   PROCALCITONIN   B-TYPE NATRIURETIC PEPTIDE   D DIMER, QUANTITATIVE   SARS-COV-2 RNA AMPLIFICATION, QUAL   C-REACTIVE PROTEIN   FERRITIN        ECG Results          EKG 12-lead (Final result)  Result time 07/27/20 15:18:30    Final result by Interface, Lab In [facility] (07/27/20 15:18:30)                 Narrative:    Test Reason : R68.89,    Vent. Rate : 079 BPM     Atrial Rate : 079 BPM     P-R Int : 134 ms          QRS Dur : 092 ms      QT Int : 368 ms       P-R-T Axes : 076 068 052 degrees     QTc Int : 421 ms    Normal sinus rhythm  Indeterminate axis  Low voltage, precordial leads  Incomplete right bundle branch block  Possible Septal infarct (cited on or before 08-JUN-2010)  Abnormal ECG  When compared with ECG of 22-OCT-2014 10:58,  Incomplete right bundle branch block is now Present  Confirmed by LINDA GEE MD ([id number]) on 7/27/2020 3:18:16 PM    Referred By: DEVORAH   SELF            Confirmed By:LINDA GEE MD                            Imaging Results          X-Ray Chest AP Portable (Final result)  Result time 07/27/20 13:21:29    Final result by Arin Mary MD (07/27/20 13:21:29)                 Impression:      As above.      Electronically signed by: Arin Mary MD  Date:    07/27/2020  Time:    13:21             Narrative:    EXAMINATION:  XR CHEST AP PORTABLE    CLINICAL HISTORY:  Cough    TECHNIQUE:  Single frontal view of the chest was performed.    COMPARISON:  10/22/2014    FINDINGS:  Subtle patchy hazy opacity in the bilateral lung bases, right greater than left, with acute inflammatory/infectious process including typical, atypical and viral infectious processes as well as aspiration to be considered.  Heart size is normal.  Skeletal structures are intact.                                 Medical Decision Making:   Initial Assessment:   Two day history 2 day history increased fatigue, chest congestion.  Known exposure to COVID-19.  Vital signs stable, oxygen saturation 100% on room air  No dyspnea  BBS: + rhonchi  Clinical Tests:   Lab Tests: Ordered and Reviewed  Radiological Study: Ordered and Reviewed  ED Management:  Lab workup unremarkable.  Afebrile, no leukocytosis.  EKG read with MD at the time it was performed. EKG without concerning findings.   CXR: Subtle patchy hazy opacity in the bilateral lung bases, right greater than left, with acute inflammatory/infectious process including typical, atypical and viral infectious processes as well as aspiration to be considered.  Heart size is normal.  Skeletal structures are intact.  Repeat COVID negative.  Plan: dc home; self-quarantine.  Follow-up in 2 days with PCP via telemedicine.  Specific return precautions discussed with patient with voiced understanding.                                   Clinical Impression:       ICD-10-CM ICD-9-CM   1. Suspected Covid-19 Virus Infection  R68.89    2. Productive cough  R05 786.2          Disposition:   Disposition: Discharged  Condition: Stable     ED Disposition Condition    Discharge Stable        ED Prescriptions     Medication Sig Dispense Start Date End Date Auth. Provider    levoFLOXacin (LEVAQUIN) 500 MG tablet Take 1 tablet (500 mg total) by mouth once daily. for 5 days 5 tablet 7/27/2020 8/1/2020 Ella Davis NP    albuterol (PROVENTIL/VENTOLIN HFA) 90 mcg/actuation inhaler Inhale 1-2 puffs into the lungs every 6 (six) hours as needed. Rescue 6.7 g 7/27/2020  Ella Davis NP    promethazine-dextromethorphan (PROMETHAZINE-DM) 6.25-15 mg/5 mL Syrp Take 5 mLs by mouth every 6 (six) hours as needed. 100 mL 7/27/2020  Ella Davis NP    montelukast (SINGULAIR) 10 mg tablet Take 1 tablet (10 mg total) by mouth every evening. 30 tablet 7/27/2020 8/26/2020 Ella Davis NP    methylPREDNISolone (MEDROL DOSEPACK) 4 mg tablet Take as directed 1 Package 7/27/2020 8/17/2020 Ella Davis NP        Follow-up Information     Follow up With Specialties Details Why Contact Info    Yadi Pratt MD Family Medicine Go in 2 days via telemedicine [id number]   [facility] [id number] [phone]                          The patient acknowledges that close follow up with medical provider is required. Instructed to follow up with PCP within 2 days. Patient was given specific return precautions. The patient agrees to comply with all instruction and directions given in the ER.              Ella Davis NP  07/27/20 [id number]

## 2020-07-27 NOTE — ED NOTES
Spoke to NP; states can hold off on starting IV as pt currently in RWR. Will move once room available. Continuing to monitor pt closely.

## 2020-07-28 LAB
CRP SERPL-MCNC: 1.6 MG/L (ref 0–8.2)
FERRITIN SERPL-MCNC: 20 NG/ML (ref 20–300)

## 2020-08-01 LAB
BACTERIA BLD CULT: NORMAL
BACTERIA BLD CULT: NORMAL

## 2020-08-27 ENCOUNTER — OFFICE VISIT (OUTPATIENT)
Dept: ALLERGY | Facility: CLINIC | Age: 50
End: 2020-08-27
Payer: COMMERCIAL

## 2020-08-27 VITALS — BODY MASS INDEX: 30.71 KG/M2 | WEIGHT: 162.69 LBS | HEIGHT: 61 IN

## 2020-08-27 DIAGNOSIS — G43.809 OTHER MIGRAINE WITHOUT STATUS MIGRAINOSUS, NOT INTRACTABLE: ICD-10-CM

## 2020-08-27 DIAGNOSIS — T78.3XXA ANGIOEDEMA, INITIAL ENCOUNTER: Primary | ICD-10-CM

## 2020-08-27 DIAGNOSIS — E03.9 HYPOTHYROIDISM, UNSPECIFIED TYPE: ICD-10-CM

## 2020-08-27 DIAGNOSIS — K58.9 IRRITABLE BOWEL SYNDROME, UNSPECIFIED TYPE: ICD-10-CM

## 2020-08-27 DIAGNOSIS — K21.9 GASTROESOPHAGEAL REFLUX DISEASE, ESOPHAGITIS PRESENCE NOT SPECIFIED: ICD-10-CM

## 2020-08-27 PROCEDURE — 99244 OFF/OP CNSLTJ NEW/EST MOD 40: CPT | Mod: S$GLB,,, | Performed by: ALLERGY & IMMUNOLOGY

## 2020-08-27 PROCEDURE — 99999 PR PBB SHADOW E&M-EST. PATIENT-LVL IV: CPT | Mod: PBBFAC,,, | Performed by: ALLERGY & IMMUNOLOGY

## 2020-08-27 PROCEDURE — 99244 PR OFFICE CONSULTATION,LEVEL IV: ICD-10-PCS | Mod: S$GLB,,, | Performed by: ALLERGY & IMMUNOLOGY

## 2020-08-27 PROCEDURE — 99999 PR PBB SHADOW E&M-EST. PATIENT-LVL IV: ICD-10-PCS | Mod: PBBFAC,,, | Performed by: ALLERGY & IMMUNOLOGY

## 2020-08-27 RX ORDER — HYDROXYZINE HYDROCHLORIDE 10 MG/1
10 TABLET, FILM COATED ORAL 3 TIMES DAILY PRN
Qty: 90 TABLET | Refills: 2 | Status: SHIPPED | OUTPATIENT
Start: 2020-08-27 | End: 2023-06-22

## 2020-08-27 RX ORDER — HYDROCHLOROTHIAZIDE 25 MG/1
25 TABLET ORAL 2 TIMES DAILY
Qty: 60 TABLET | Refills: 11 | Status: SHIPPED | OUTPATIENT
Start: 2020-08-27 | End: 2020-08-27

## 2020-08-27 RX ORDER — HYDROXYZINE HYDROCHLORIDE 25 MG/1
25 TABLET, FILM COATED ORAL 2 TIMES DAILY
Qty: 60 TABLET | Refills: 5 | Status: SHIPPED | OUTPATIENT
Start: 2020-08-27 | End: 2021-05-20

## 2020-08-27 RX ORDER — EPINEPHRINE 0.3 MG/.3ML
1 INJECTION SUBCUTANEOUS ONCE
Qty: 2 DEVICE | Refills: 5 | Status: SHIPPED | OUTPATIENT
Start: 2020-08-27 | End: 2020-08-27

## 2020-08-27 NOTE — TELEPHONE ENCOUNTER
----- Message from Kerri Philip sent at 8/27/2020  1:05 PM CDT -----  Regarding: Pharmacy  Reason: Pharmacy called stating wrong prescription was sent over hydroCHLOROthiazide (HYDRODIURIL) 25 MG tablet was sent but states should have been hydrOXYzine HCl (ATARAX) 10 MG Tab..........Patient is at the pharmacy waiting        Contact:   [facility] MARGARITO SALAMANCA  [address] [id number] [address] [place] [address]  Phone: [phone] Fax: [phone]

## 2020-08-27 NOTE — PROGRESS NOTES
Sagrario Almonte is a 50-year-old female with recurrent angioedema was last seen by me June 22, 2017.  She is referred by her primary care provider, Dr. Yadi Pratt in [place].  She is here alone.  She is from [place], [place].    After her last visit, she started increasing the amount hydroxyzine that she took.  She went from 30 milligrams at night to 50 milligrams at night.  She increase this last about eight months ago.    She does not take any other antihistamines.    On this does her angioedema is completely controlled.  When she runs out of medication she may have episodes about a week later.    She ran out a week and a half ago and had an episode of uvular swelling last night.  It has improved today.    She has not had any urticaria.    There is no association with any food, contact, or ingestion.    For ROS, FH, SH please see Allergy and Asthma Questionnaire dated today.    Some relevant pertinent positives:    Review of Systems/PMH:    She was diagnosed with hypothyroidism about a year ago and is now on levothyroxine.    She had an upper respiratory infection with pneumonia in July.  Three people in her household were positive for COVID-19.  Her test was negative but it was presumed that this is what she had.    She has had diarrhea off and on since then.    She continues to have IBS and fibromyalgia.  Her abdominal pain has resolved.    Her reflux is controlled on Dexilant.    She is now taking Ajovy for her migraines with improvement.    [facility] PEQ ALLERGY QUESTIONNAIRE LONG 6/22/2017   Head or facial pain: Headaches   Eyes: Sensitivity to light   Ears: No symptoms   Nose: No symptoms   Throat: Itching   Lungs: No symptoms   Skin: No symptoms   Constitution: No symptoms   Cardiovascular: No symptoms   Gastrointestinal: No symptoms   Genital/ urinary: No symptoms   Musculoskeletal: Muscle pain, Joint pain   Endocrine: No symptoms   Hematologic: Bruises/ bleeds easily     Family History:  Her mother had  seasonal rhinitis.    Home environment:  She has lived in the same house for the past three years.  There was no water damage.  There is no evidence of mold.  There is no carpeting in the bedroom.  There is one dog that lives in side.  She does not have any problems around the dog.    Social History:  She is a homemaker.  She is nonsmoker.    Physical Examination:  General: Well-developed, well-nourished, no acute distress.  Head: No sinus tenderness.  Eyes: Conjunctivae:  No bulbar or palpebral conjunctival injection.  Ears: EAC's clear.  TM's clear.  No pre-auricular nodes.  Nose: Nasal Mucosa:  Pink.  Septum: No apparent deviation.  Turbinates:  No significant edema.  Polyps/Mass:  None visible.  Teeth/Gums:  No bleeding noted.  Oropharynx: No exudates.  Neck: Supple without thyromegaly. No cervical lymphadenopathy.    Respiratory/Chest: Effort: Good.  Auscultation:  Clear bilaterally.  Cardiovascular:  No murmur, rubs, or gallop heard.   GI:  Non-tender.  No masses.  No organomegaly.  Extremities:  No cyanosis, clubbing, or edema.  Skin: Good turgor.  No urticaria or angioedema.  Neuro/Psych: Oriented x 3.    Laboratory 6/7/2017:  ImmunoCAP: Negative.  C1 esterase inhibitor level: 25.  C1 esterase inhibitor functional: Greater than 90.  C3: 112.  C4: 24.  TSH: 1.194.  Thyroid peroxidase antibody level: Less than 6.0.  Thyroglobulin antibody level: Less than 4.0.  Vitamin D level: 36.  Anti-IgE receptor antibody level: 1.  Serum tryptase: 3.0.  CBC: Normal.  CMP: Glucose 55.  SPEP: Normal.    Food skin tests prick #60 6/22/2017:  3+ histamine control. All tests are negative.    Assessment:  1.  Chronic recurrent urticaria and angioedema, probably idiopathic.  2.  Hypothyroidism on replacement.  3.  GERD.  4.  IBS.  5.  Fibromyalgia.  6.  Migraine headaches.  7.  S/P right internal carotid aneurysm repair September 2006.  8.  Possible COVID-19 infection July 2020.    Recommendations:  1.  Continue hydroxyzine 50  milligrams at night.  2.  She may increase this if needed.  3.  Xolair discussed but she would not like to pursue.  4.  Return to clinic in one year or sooner if needed.    Patient education June 22, 2019:  In  Allergic mechanisms and treatment options were reviewed in detail. Urticaria and angioedema were reviewed.

## 2020-08-28 NOTE — TELEPHONE ENCOUNTER
Verified Atarax prescription with pharmacy.    ----- Message from Hetal Walsh sent at 8/28/2020  8:21 AM CDT -----  Regarding: [facility]  Contact: [phone]/Christine  Verify med.  Received 2 rx's with different strengths on the atarax.  Which one do they need to fill the 25mg and 10mg.  Call pharmacy to clarify.

## 2020-09-11 ENCOUNTER — HOSPITAL ENCOUNTER (OUTPATIENT)
Dept: RADIOLOGY | Facility: HOSPITAL | Age: 50
Discharge: HOME | End: 2020-09-11
Attending: NURSE PRACTITIONER
Payer: COMMERCIAL

## 2020-09-11 DIAGNOSIS — R06.00 DYSPNEA, UNSPECIFIED: ICD-10-CM

## 2020-09-11 PROCEDURE — 71046 X-RAY EXAM CHEST 2 VIEWS: CPT | Mod: TC

## 2020-09-11 PROCEDURE — 71046 XR CHEST PA AND LATERAL: ICD-10-PCS | Mod: 26,,, | Performed by: RADIOLOGY

## 2020-09-11 PROCEDURE — 71046 X-RAY EXAM CHEST 2 VIEWS: CPT | Mod: 26,,, | Performed by: RADIOLOGY

## 2020-09-18 ENCOUNTER — HOSPITAL ENCOUNTER (OUTPATIENT)
Dept: RADIOLOGY | Facility: HOSPITAL | Age: 50
Discharge: HOME | End: 2020-09-18
Attending: NURSE PRACTITIONER
Payer: COMMERCIAL

## 2020-09-18 DIAGNOSIS — R06.00 DYSPNEA: ICD-10-CM

## 2020-09-18 DIAGNOSIS — R06.00 DYSPNEA: Primary | ICD-10-CM

## 2020-09-18 PROCEDURE — 71046 XR CHEST PA AND LATERAL: ICD-10-PCS | Mod: 26,,, | Performed by: RADIOLOGY

## 2020-09-18 PROCEDURE — 71046 X-RAY EXAM CHEST 2 VIEWS: CPT | Mod: 26,,, | Performed by: RADIOLOGY

## 2020-09-18 PROCEDURE — 71046 X-RAY EXAM CHEST 2 VIEWS: CPT | Mod: TC

## 2021-01-02 ENCOUNTER — HOSPITAL ENCOUNTER (EMERGENCY)
Facility: HOSPITAL | Age: 51
Discharge: HOME | End: 2021-01-02
Attending: SURGERY

## 2021-01-02 VITALS
RESPIRATION RATE: 20 BRPM | SYSTOLIC BLOOD PRESSURE: 122 MMHG | WEIGHT: 168.75 LBS | BODY MASS INDEX: 31.89 KG/M2 | TEMPERATURE: 96 F | HEART RATE: 112 BPM | OXYGEN SATURATION: 98 % | DIASTOLIC BLOOD PRESSURE: 78 MMHG

## 2021-01-02 DIAGNOSIS — T78.3XXA ANGIOEDEMA, INITIAL ENCOUNTER: Primary | ICD-10-CM

## 2021-01-02 PROCEDURE — 96376 TX/PRO/DX INJ SAME DRUG ADON: CPT

## 2021-01-02 PROCEDURE — 96374 THER/PROPH/DIAG INJ IV PUSH: CPT

## 2021-01-02 PROCEDURE — 96372 THER/PROPH/DIAG INJ SC/IM: CPT | Mod: 59

## 2021-01-02 PROCEDURE — 96375 TX/PRO/DX INJ NEW DRUG ADDON: CPT

## 2021-01-02 PROCEDURE — 99284 EMERGENCY DEPT VISIT MOD MDM: CPT | Mod: 25

## 2021-01-02 PROCEDURE — 25000003 PHARM REV CODE 250: Performed by: SURGERY

## 2021-01-02 PROCEDURE — 63600175 PHARM REV CODE 636 W HCPCS: Performed by: SURGERY

## 2021-01-02 RX ORDER — EPINEPHRINE 0.3 MG/.3ML
0.3 INJECTION SUBCUTANEOUS
Status: COMPLETED | OUTPATIENT
Start: 2021-01-02 | End: 2021-01-02

## 2021-01-02 RX ORDER — METHYLPREDNISOLONE SOD SUCC 125 MG
125 VIAL (EA) INJECTION
Status: COMPLETED | OUTPATIENT
Start: 2021-01-02 | End: 2021-01-02

## 2021-01-02 RX ORDER — EPINEPHRINE 0.3 MG/.3ML
1 INJECTION SUBCUTANEOUS ONCE
Qty: 0.3 ML | Refills: 0 | Status: SHIPPED | OUTPATIENT
Start: 2021-01-02 | End: 2021-01-02

## 2021-01-02 RX ORDER — METHYLPREDNISOLONE 4 MG/1
TABLET ORAL
Qty: 1 PACKAGE | Refills: 0 | Status: SHIPPED | OUTPATIENT
Start: 2021-01-02 | End: 2023-06-22

## 2021-01-02 RX ORDER — DIPHENHYDRAMINE HYDROCHLORIDE 50 MG/ML
50 INJECTION INTRAMUSCULAR; INTRAVENOUS
Status: COMPLETED | OUTPATIENT
Start: 2021-01-02 | End: 2021-01-02

## 2021-01-02 RX ORDER — FAMOTIDINE 20 MG/1
40 TABLET, FILM COATED ORAL 2 TIMES DAILY
Qty: 40 TABLET | Refills: 0 | Status: SHIPPED | OUTPATIENT
Start: 2021-01-02 | End: 2023-06-22

## 2021-01-02 RX ORDER — FAMOTIDINE 10 MG/ML
40 INJECTION INTRAVENOUS
Status: COMPLETED | OUTPATIENT
Start: 2021-01-02 | End: 2021-01-02

## 2021-01-02 RX ADMIN — DIPHENHYDRAMINE HYDROCHLORIDE 50 MG: 50 INJECTION, SOLUTION INTRAMUSCULAR; INTRAVENOUS at 05:01

## 2021-01-02 RX ADMIN — DIPHENHYDRAMINE HYDROCHLORIDE 50 MG: 50 INJECTION, SOLUTION INTRAMUSCULAR; INTRAVENOUS at 07:01

## 2021-01-02 RX ADMIN — EPINEPHRINE 0.3 MG: 0.3 INJECTION INTRAMUSCULAR at 05:01

## 2021-01-02 RX ADMIN — METHYLPREDNISOLONE SODIUM SUCCINATE 125 MG: 125 INJECTION, POWDER, FOR SOLUTION INTRAMUSCULAR; INTRAVENOUS at 05:01

## 2021-01-02 RX ADMIN — METHYLPREDNISOLONE SODIUM SUCCINATE 125 MG: 125 INJECTION, POWDER, FOR SOLUTION INTRAMUSCULAR; INTRAVENOUS at 07:01

## 2021-01-02 RX ADMIN — FAMOTIDINE 40 MG: 10 INJECTION INTRAVENOUS at 05:01

## 2021-01-15 ENCOUNTER — LAB VISIT (OUTPATIENT)
Dept: LAB | Facility: HOSPITAL | Age: 51
End: 2021-01-15
Attending: NURSE PRACTITIONER
Payer: COMMERCIAL

## 2021-01-15 DIAGNOSIS — Z00.00 ENCOUNTER FOR GENERAL ADULT MEDICAL EXAMINATION WITHOUT ABNORMAL FINDINGS: Primary | ICD-10-CM

## 2021-01-15 LAB
ALBUMIN SERPL BCP-MCNC: 3.6 G/DL (ref 3.5–5.2)
ALP SERPL-CCNC: 67 U/L (ref 55–135)
ALT SERPL W/O P-5'-P-CCNC: 24 U/L (ref 10–44)
ANION GAP SERPL CALC-SCNC: 13 MMOL/L (ref 8–16)
AST SERPL-CCNC: 24 U/L (ref 10–40)
BILIRUB SERPL-MCNC: 0.4 MG/DL (ref 0.1–1)
BUN SERPL-MCNC: 12 MG/DL (ref 6–20)
CALCIUM SERPL-MCNC: 8.8 MG/DL (ref 8.7–10.5)
CHLORIDE SERPL-SCNC: 106 MMOL/L (ref 95–110)
CHOLEST SERPL-MCNC: 200 MG/DL (ref 120–199)
CHOLEST/HDLC SERPL: 3.4 {RATIO} (ref 2–5)
CO2 SERPL-SCNC: 22 MMOL/L (ref 23–29)
CREAT SERPL-MCNC: 0.8 MG/DL (ref 0.5–1.4)
ERYTHROCYTE [DISTWIDTH] IN BLOOD BY AUTOMATED COUNT: 13.3 % (ref 11.5–14.5)
EST. GFR  (AFRICAN AMERICAN): >60 ML/MIN/1.73 M^2
EST. GFR  (NON AFRICAN AMERICAN): >60 ML/MIN/1.73 M^2
GLUCOSE SERPL-MCNC: 90 MG/DL (ref 70–110)
HCT VFR BLD AUTO: 40.8 % (ref 37–48.5)
HDLC SERPL-MCNC: 59 MG/DL (ref 40–75)
HDLC SERPL: 29.5 % (ref 20–50)
HGB BLD-MCNC: 13.3 G/DL (ref 12–16)
LDLC SERPL CALC-MCNC: 107.2 MG/DL (ref 63–159)
MCH RBC QN AUTO: 31.4 PG (ref 27–31)
MCHC RBC AUTO-ENTMCNC: 32.6 G/DL (ref 32–36)
MCV RBC AUTO: 96 FL (ref 82–98)
NONHDLC SERPL-MCNC: 141 MG/DL
PLATELET # BLD AUTO: 285 K/UL (ref 150–350)
PMV BLD AUTO: 10.7 FL (ref 9.2–12.9)
POTASSIUM SERPL-SCNC: 3.8 MMOL/L (ref 3.5–5.1)
PROT SERPL-MCNC: 6.3 G/DL (ref 6–8.4)
RBC # BLD AUTO: 4.24 M/UL (ref 4–5.4)
SODIUM SERPL-SCNC: 141 MMOL/L (ref 136–145)
T4 FREE SERPL-MCNC: 0.88 NG/DL (ref 0.71–1.51)
TRIGL SERPL-MCNC: 169 MG/DL (ref 30–150)
TSH SERPL DL<=0.005 MIU/L-ACNC: 6.5 UIU/ML (ref 0.4–4)
WBC # BLD AUTO: 7.5 K/UL (ref 3.9–12.7)

## 2021-01-15 PROCEDURE — 80053 COMPREHEN METABOLIC PANEL: CPT

## 2021-01-15 PROCEDURE — 85027 COMPLETE CBC AUTOMATED: CPT

## 2021-01-15 PROCEDURE — 80061 LIPID PANEL: CPT

## 2021-01-15 PROCEDURE — 84439 ASSAY OF FREE THYROXINE: CPT

## 2021-01-15 PROCEDURE — 36415 COLL VENOUS BLD VENIPUNCTURE: CPT

## 2021-01-15 PROCEDURE — 83036 HEMOGLOBIN GLYCOSYLATED A1C: CPT

## 2021-01-15 PROCEDURE — 84443 ASSAY THYROID STIM HORMONE: CPT

## 2021-01-16 LAB
ESTIMATED AVG GLUCOSE: 94 MG/DL (ref 68–131)
HBA1C MFR BLD HPLC: 4.9 % (ref 4–5.6)

## 2021-05-04 ENCOUNTER — PATIENT MESSAGE (OUTPATIENT)
Dept: RESEARCH | Facility: HOSPITAL | Age: 51
End: 2021-05-04

## 2021-06-17 ENCOUNTER — LAB VISIT (OUTPATIENT)
Dept: LAB | Facility: HOSPITAL | Age: 51
End: 2021-06-17
Attending: NURSE PRACTITIONER

## 2021-06-17 DIAGNOSIS — E03.9 PRIMARY HYPOTHYROIDISM: Primary | ICD-10-CM

## 2021-06-17 LAB — TSH SERPL DL<=0.005 MIU/L-ACNC: 3.85 UIU/ML (ref 0.4–4)

## 2021-06-17 PROCEDURE — 84443 ASSAY THYROID STIM HORMONE: CPT | Performed by: NURSE PRACTITIONER

## 2021-06-17 PROCEDURE — 36415 COLL VENOUS BLD VENIPUNCTURE: CPT | Performed by: NURSE PRACTITIONER

## 2022-09-08 ENCOUNTER — HOSPITAL ENCOUNTER (EMERGENCY)
Facility: HOSPITAL | Age: 52
Discharge: HOME | End: 2022-09-08
Attending: STUDENT IN AN ORGANIZED HEALTH CARE EDUCATION/TRAINING PROGRAM
Payer: COMMERCIAL

## 2022-09-08 VITALS
SYSTOLIC BLOOD PRESSURE: 154 MMHG | RESPIRATION RATE: 20 BRPM | DIASTOLIC BLOOD PRESSURE: 78 MMHG | TEMPERATURE: 97 F | BODY MASS INDEX: 32.53 KG/M2 | OXYGEN SATURATION: 100 % | WEIGHT: 172.19 LBS | HEART RATE: 82 BPM

## 2022-09-08 DIAGNOSIS — T78.3XXA ANGIOEDEMA, INITIAL ENCOUNTER: Primary | ICD-10-CM

## 2022-09-08 PROCEDURE — 96374 THER/PROPH/DIAG INJ IV PUSH: CPT

## 2022-09-08 PROCEDURE — 96375 TX/PRO/DX INJ NEW DRUG ADDON: CPT

## 2022-09-08 PROCEDURE — 99284 EMERGENCY DEPT VISIT MOD MDM: CPT | Mod: 25

## 2022-09-08 PROCEDURE — 96376 TX/PRO/DX INJ SAME DRUG ADON: CPT

## 2022-09-08 PROCEDURE — 63600175 PHARM REV CODE 636 W HCPCS: Performed by: STUDENT IN AN ORGANIZED HEALTH CARE EDUCATION/TRAINING PROGRAM

## 2022-09-08 PROCEDURE — 25000003 PHARM REV CODE 250: Performed by: STUDENT IN AN ORGANIZED HEALTH CARE EDUCATION/TRAINING PROGRAM

## 2022-09-08 RX ORDER — DIPHENHYDRAMINE HYDROCHLORIDE 50 MG/ML
50 INJECTION INTRAMUSCULAR; INTRAVENOUS
Status: COMPLETED | OUTPATIENT
Start: 2022-09-08 | End: 2022-09-08

## 2022-09-08 RX ORDER — METHYLPREDNISOLONE SOD SUCC 125 MG
125 VIAL (EA) INJECTION
Status: COMPLETED | OUTPATIENT
Start: 2022-09-08 | End: 2022-09-08

## 2022-09-08 RX ORDER — TRANEXAMIC ACID 100 MG/ML
1000 INJECTION, SOLUTION INTRAVENOUS
Status: COMPLETED | OUTPATIENT
Start: 2022-09-08 | End: 2022-09-08

## 2022-09-08 RX ORDER — ONDANSETRON 2 MG/ML
8 INJECTION INTRAMUSCULAR; INTRAVENOUS
Status: COMPLETED | OUTPATIENT
Start: 2022-09-08 | End: 2022-09-08

## 2022-09-08 RX ORDER — FAMOTIDINE 10 MG/ML
20 INJECTION INTRAVENOUS
Status: COMPLETED | OUTPATIENT
Start: 2022-09-08 | End: 2022-09-08

## 2022-09-08 RX ADMIN — TRANEXAMIC ACID 1000 MG: 100 INJECTION, SOLUTION INTRAVENOUS at 09:09

## 2022-09-08 RX ADMIN — DIPHENHYDRAMINE HYDROCHLORIDE 50 MG: 50 INJECTION INTRAMUSCULAR; INTRAVENOUS at 09:09

## 2022-09-08 RX ADMIN — FAMOTIDINE 20 MG: 10 INJECTION, SOLUTION INTRAVENOUS at 09:09

## 2022-09-08 RX ADMIN — ONDANSETRON HYDROCHLORIDE 8 MG: 2 SOLUTION INTRAMUSCULAR; INTRAVENOUS at 09:09

## 2022-09-08 RX ADMIN — FAMOTIDINE 20 MG: 10 INJECTION, SOLUTION INTRAVENOUS at 12:09

## 2022-09-08 RX ADMIN — METHYLPREDNISOLONE SODIUM SUCCINATE 125 MG: 125 INJECTION, POWDER, FOR SOLUTION INTRAMUSCULAR; INTRAVENOUS at 12:09

## 2022-09-08 RX ADMIN — DIPHENHYDRAMINE HYDROCHLORIDE 50 MG: 50 INJECTION INTRAMUSCULAR; INTRAVENOUS at 12:09

## 2022-09-08 RX ADMIN — METHYLPREDNISOLONE SODIUM SUCCINATE 125 MG: 125 INJECTION, POWDER, FOR SOLUTION INTRAMUSCULAR; INTRAVENOUS at 09:09

## 2022-09-08 NOTE — ED PROVIDER NOTES
Encounter Date: 2022    This document was partially completed using speech recognition software and may contain misspellings, grammatical errors, and/or unexpected word substitutions.       History     Chief Complaint   Patient presents with    Oral Swelling     Pt c/o lip and throat swelling that started Monday. Pt c/o trouble swallowing.     52 year old female with a PMHx of angioedema presents to the ED with angioedema. Started on Monday with lip swelling and has gradually worsened. Started in the upper lip and is now in the lower lip. Reports voice change and throat swelling and well as left palm swelling. Doesn't take any ACE-I or ARBs. Has seen immunologist with vigrourous testing and no source identified. She reports her body makes more histamine than normal. This has been a recurring issue for 10 years. Talking in complete sentences with a softer voice. No drooling, no tripoding. She did not eat or drink anything new or out of the ordinary.    Review of patient's allergies indicates:   Allergen Reactions    Adhesive     Bextra [valdecoxib]      Past Medical History:   Diagnosis Date    Abnormal Pap smear     Pt unknown of results.     Abnormal Pap smear of cervix     Fibromyalgia     Migraines     Thyroid disease      Past Surgical History:   Procedure Laterality Date    BREAST RECONSTRUCTION      Augmentation    CERVICAL BIOPSY  W/ LOOP ELECTRODE EXCISION      Pt unknown of results.     SECTION      x2    CHOLECYSTECTOMY  2012    CRANIOTOMY      FOOT SURGERY      Right    TUBAL LIGATION       Family History   Problem Relation Age of Onset    Cancer Maternal Grandmother 70        Stomach, Throat & Brain    Cancer Maternal Grandfather 83        Renal Cancer    Cancer Father 58        Lung Cancer    Cancer Mother 29        Uterine Cancer    Ovarian cancer Neg Hx     Colon cancer Neg Hx     Breast cancer Neg Hx      Social History     Tobacco Use    Smoking status: Never    Smokeless  tobacco: Never   Substance Use Topics    Alcohol use: Yes     Comment: Socially    Drug use: No     Review of Systems   Constitutional:  Negative for chills and fever.   HENT:  Positive for facial swelling and voice change. Negative for congestion, rhinorrhea, sneezing and trouble swallowing.    Eyes:  Negative for discharge and redness.   Respiratory:  Negative for cough and shortness of breath.    Cardiovascular:  Negative for chest pain and palpitations.   Gastrointestinal:  Negative for abdominal pain, diarrhea, nausea and vomiting.   Genitourinary:  Negative for dysuria, frequency, vaginal bleeding and vaginal discharge.   Musculoskeletal:  Negative for back pain and neck pain.   Skin:  Negative for rash and wound.   Neurological:  Negative for weakness, numbness and headaches.     Physical Exam     Initial Vitals [09/08/22 0907]   BP Pulse Resp Temp SpO2   (!) 154/78 92 20 96.5 °F (35.8 °C) 99 %      MAP       --         Physical Exam    Nursing note and vitals reviewed.  Constitutional: She appears well-developed. She is not diaphoretic. No distress.   Mild lower lip angioedema   HENT:   Head: Normocephalic and atraumatic.   Right Ear: External ear normal.   Left Ear: External ear normal.   Mouth/Throat: Uvula is midline, oropharynx is clear and moist and mucous membranes are normal. No trismus in the jaw. No uvula swelling.   Eyes: Right eye exhibits no discharge. Left eye exhibits no discharge. No scleral icterus.   Neck: Neck supple. No stridor present.    Full passive range of motion without pain.     Cardiovascular:  Normal rate and regular rhythm.           Pulmonary/Chest: Breath sounds normal. No stridor. No respiratory distress. She has no wheezes. She has no rhonchi. She has no rales.   Abdominal: Abdomen is soft. There is no abdominal tenderness. There is no guarding.   Musculoskeletal:         General: No edema.      Cervical back: Full passive range of motion without pain and neck supple.      Neurological: She is alert and oriented to person, place, and time.   Skin: Skin is warm and dry. Capillary refill takes less than 2 seconds.   Psychiatric: She has a normal mood and affect.       ED Course   Procedures  Labs Reviewed - No data to display       Imaging Results    None          Medications   diphenhydrAMINE injection 50 mg (50 mg Intravenous Given 9/8/22 0938)   famotidine (PF) injection 20 mg (20 mg Intravenous Given 9/8/22 0938)   methylPREDNISolone sodium succinate injection 125 mg (125 mg Intravenous Given 9/8/22 0938)   tranexamic acid (CYKLOKAPRON) 1,000 mg in sodium chloride 0.9 % 100 mL (ready to mix system) (0 mg Intravenous Stopped 9/8/22 0953)   ondansetron injection 8 mg (8 mg Intravenous Given 9/8/22 0955)   diphenhydrAMINE injection 50 mg (50 mg Intravenous Given 9/8/22 1214)   famotidine (PF) injection 20 mg (20 mg Intravenous Given 9/8/22 1214)   methylPREDNISolone sodium succinate injection 125 mg (125 mg Intravenous Given 9/8/22 1214)     Medical Decision Making:   Differential Diagnosis:   Ddx: airway obstruction, angioedema, Ramírez's, anaphylaxis, allergic reaction  ED Management:  Based on the patient's evaluation - patient appears well for discharge home. Patient reports usually after 2 rounds of treatment, she feels better and she did. She was drinking water, talking. She states she has Epi pens at home. Feeling better after 5 hours in the ED - will discharge home with return precautions given. Patient is in agreement.                    Clinical Impression:   Final diagnoses:  [T78.3XXA] Angioedema, initial encounter (Primary)      ED Disposition Condition    Discharge Stable          ED Prescriptions    None       Follow-up Information       Follow up With Specialties Details Why Contact Info    [facility] - Emergency Dept Emergency Medicine Go to  If symptoms worsen [id number] [id number] [phone]    Yadi Pratt MD Family Medicine  Schedule an appointment as soon as possible for a visit in 1 week As needed [id number]   [address] [place] [id number]  [phone]               Jamie Boykin DO  09/08/22 1401

## 2022-11-15 RX ORDER — OXYBUTYNIN CHLORIDE 5 MG/1
TABLET ORAL
Qty: 60 TABLET | Refills: 0 | OUTPATIENT
Start: 2022-11-15

## 2022-12-27 RX ORDER — OXYBUTYNIN CHLORIDE 5 MG/1
TABLET ORAL
Qty: 60 TABLET | Refills: 0 | OUTPATIENT
Start: 2022-12-27

## 2023-03-31 ENCOUNTER — TELEPHONE (OUTPATIENT)
Dept: ALLERGY | Facility: CLINIC | Age: 53
End: 2023-03-31

## 2023-04-20 ENCOUNTER — OFFICE VISIT (OUTPATIENT)
Dept: ALLERGY | Facility: CLINIC | Age: 53
End: 2023-04-20
Payer: COMMERCIAL

## 2023-04-20 VITALS — HEIGHT: 61 IN | WEIGHT: 174.63 LBS | BODY MASS INDEX: 32.97 KG/M2

## 2023-04-20 DIAGNOSIS — E04.1 THYROID NODULE: ICD-10-CM

## 2023-04-20 DIAGNOSIS — R41.3 MEMORY CHANGE: ICD-10-CM

## 2023-04-20 DIAGNOSIS — Z86.69 HISTORY OF MIGRAINE HEADACHES: ICD-10-CM

## 2023-04-20 DIAGNOSIS — L50.1 IDIOPATHIC URTICARIA: ICD-10-CM

## 2023-04-20 DIAGNOSIS — K21.9 GASTROESOPHAGEAL REFLUX DISEASE, UNSPECIFIED WHETHER ESOPHAGITIS PRESENT: ICD-10-CM

## 2023-04-20 DIAGNOSIS — T78.3XXD ANGIOEDEMA, SUBSEQUENT ENCOUNTER: Primary | ICD-10-CM

## 2023-04-20 DIAGNOSIS — E03.9 HYPOTHYROIDISM, UNSPECIFIED TYPE: ICD-10-CM

## 2023-04-20 PROCEDURE — 99214 PR OFFICE/OUTPT VISIT, EST, LEVL IV, 30-39 MIN: ICD-10-PCS | Mod: S$GLB,,, | Performed by: ALLERGY & IMMUNOLOGY

## 2023-04-20 PROCEDURE — 1160F RVW MEDS BY RX/DR IN RCRD: CPT | Mod: CPTII,S$GLB,, | Performed by: ALLERGY & IMMUNOLOGY

## 2023-04-20 PROCEDURE — 3008F PR BODY MASS INDEX (BMI) DOCUMENTED: ICD-10-PCS | Mod: CPTII,S$GLB,, | Performed by: ALLERGY & IMMUNOLOGY

## 2023-04-20 PROCEDURE — 1160F PR REVIEW ALL MEDS BY PRESCRIBER/CLIN PHARMACIST DOCUMENTED: ICD-10-PCS | Mod: CPTII,S$GLB,, | Performed by: ALLERGY & IMMUNOLOGY

## 2023-04-20 PROCEDURE — 99214 OFFICE O/P EST MOD 30 MIN: CPT | Mod: S$GLB,,, | Performed by: ALLERGY & IMMUNOLOGY

## 2023-04-20 PROCEDURE — 99999 PR PBB SHADOW E&M-EST. PATIENT-LVL IV: ICD-10-PCS | Mod: PBBFAC,,, | Performed by: ALLERGY & IMMUNOLOGY

## 2023-04-20 PROCEDURE — 1159F MED LIST DOCD IN RCRD: CPT | Mod: CPTII,S$GLB,, | Performed by: ALLERGY & IMMUNOLOGY

## 2023-04-20 PROCEDURE — 1159F PR MEDICATION LIST DOCUMENTED IN MEDICAL RECORD: ICD-10-PCS | Mod: CPTII,S$GLB,, | Performed by: ALLERGY & IMMUNOLOGY

## 2023-04-20 PROCEDURE — 99999 PR PBB SHADOW E&M-EST. PATIENT-LVL IV: CPT | Mod: PBBFAC,,, | Performed by: ALLERGY & IMMUNOLOGY

## 2023-04-20 PROCEDURE — 3008F BODY MASS INDEX DOCD: CPT | Mod: CPTII,S$GLB,, | Performed by: ALLERGY & IMMUNOLOGY

## 2023-04-20 RX ORDER — EPINEPHRINE 0.3 MG/.3ML
1 INJECTION SUBCUTANEOUS ONCE
Qty: 1 EACH | Refills: 2 | Status: SHIPPED | OUTPATIENT
Start: 2023-04-20 | End: 2023-06-22

## 2023-04-20 RX ORDER — HYDROXYZINE HYDROCHLORIDE 25 MG/1
25 TABLET, FILM COATED ORAL 3 TIMES DAILY PRN
Qty: 60 TABLET | Refills: 4 | Status: SHIPPED | OUTPATIENT
Start: 2023-04-20 | End: 2024-01-29

## 2023-04-20 RX ORDER — OMALIZUMAB 150 MG/ML
300 INJECTION, SOLUTION SUBCUTANEOUS
Qty: 2 ML | Refills: 13 | Status: SHIPPED | OUTPATIENT
Start: 2023-04-20 | End: 2024-03-22

## 2023-04-20 RX ORDER — HYDROCHLOROTHIAZIDE 25 MG/1
25 TABLET ORAL DAILY
COMMUNITY

## 2023-04-20 NOTE — PROGRESS NOTES
Sagrario Almonte returns to clinic today for continued evaluation of recurrent angioedema.  She was last seen August 27, 2020.     Since her last visit, she has continued to have recurrent angioedema of her tongue and throat.  She has been to [facility] or [facility] in [place] she estimates 7 times in the past three years.    She continues to take hydroxyzine 50 milligrams at night. Since starting this medication she does think that her angioedema is much improved.    She has used her EpiPen and once in the last several years.    She does not have any urticaria.     There is no association with any food, contact, or ingestion.     She was diagnosed with hypothyroidism about two years ago and has since been on levothyroxine 25 micrograms a day.  She had blood work at [facility] several months ago that was normal.    She has PVCs and takes propranolol 10 milligrams as needed.     She has reflux and takes Dexilant as needed.    She would like to consider Xolair.    She had an internal carotid aneurysm repair in September 2006.  She has had some slight memory difficulty since then that has been increasing in severity recently.    [facility] PEQ ALLERGY QUESTIONNAIRE SHORT 4/20/2023   Facial swelling? Yes   Sinus pain? No   Sinus pressure? No   Ears: No symptoms   Nose: No symptoms   Throat: No symptoms   Eye itching? No   Eye redness? No   Eye discharge? No   Eye pain?  Yes   Light sensitivity / light hurts the eyes? No   Lungs: No symptoms   Skin: No symptoms      Physical Examination:  General: Well-developed, well-nourished, no acute distress.  Head: No sinus tenderness.  Eyes: Conjunctivae:  No bulbar or palpebral conjunctival injection.  Ears: EAC's clear.  TM's clear.  No pre-auricular nodes.  Nose: Nasal Mucosa:  Pink.  Septum: No apparent deviation.  Turbinates:  No significant edema.  Polyps/Mass:  None visible.  Teeth/Gums:  No bleeding noted.  Oropharynx: No exudates.  Neck: Supple without  thyromegaly. No cervical lymphadenopathy.    Respiratory/Chest: Effort: Good.  Auscultation:  Clear bilaterally.  Skin: Good turgor.  No urticaria or angioedema.  Neuro/Psych: Oriented x 3.    Laboratory 6/7/2017:  ImmunoCAP: Negative.  C1 esterase inhibitor level: 25.  C1 esterase inhibitor functional: Greater than 90.  C3: 112.  C4: 24.  TSH: 1.194.  Thyroid peroxidase antibody level: Less than 6.0.  Thyroglobulin antibody level: Less than 4.0.  Vitamin D level: 36.  Anti-IgE receptor antibody level: 1.  Serum tryptase: 3.0.  CBC: Normal.  CMP: Glucose 55.  SPEP: Normal.    Food skin tests prick #60 6/22/2017:  3+ histamine control. All tests are negative.    Laboratory 01/15/2022:   CMP:  CO2 22.   CBC:  Normal.   Lipid panel:  Cholesterol 200.   TSH:  6.495.   Free T4 0.88.   Hemoglobin A1c:  4.9.    Laboratory 06/17/2021:   TSH:  3.850.    Assessment:  1.  Chronic recurrent urticaria and angioedema, probably idiopathic.  2.  Hypothyroidism on replacement.  3.  GERD.  4.  IBS.  5.  Fibromyalgia.  6.  Migraine headaches.  7.  S/P right internal carotid aneurysm repair September 2006.  8.  Increasing memory difficulty.    Recommendations:  1. Continue hydroxyzine 50 milligrams at night.  2. Add Zyrtec 10 milligrams a day. She may take up to four day if needed.  3. Start Xolair.  4. Obtain lab work from [facility].  5. EpiPen refilled.   6. Return to clinic in 2 to 3 months or sooner if needed.    Patient education June 22, 2019:    Allergic mechanisms and treatment options were reviewed in detail. Urticaria and angioedema were reviewed.

## 2023-04-27 PROBLEM — L50.1 IDIOPATHIC URTICARIA: Status: ACTIVE | Noted: 2023-04-27

## 2023-04-28 ENCOUNTER — TELEPHONE (OUTPATIENT)
Dept: ALLERGY | Facility: CLINIC | Age: 53
End: 2023-04-28
Payer: COMMERCIAL

## 2023-04-28 ENCOUNTER — TELEPHONE (OUTPATIENT)
Dept: PHARMACY | Facility: CLINIC | Age: 53
End: 2023-04-28
Payer: COMMERCIAL

## 2023-04-28 NOTE — TELEPHONE ENCOUNTER
Incoming call from patient. Completed welcome call. Patient voiced understanding and had no further questions.   '

## 2023-04-28 NOTE — TELEPHONE ENCOUNTER
Ellen, this is Patricio Benjamin, clinical pharmacist with [facility] that is part of your care team.  We have begun working on your prescription that your doctor has sent us. Our next steps include:     Working with your insurance company to obtain approval for your medication  Working with you to ensure your medication is affordable     We will be calling you along the way with updates on your medication but if you have any concerns or receive information that you would like to discuss please reach us at [phone].    Welcome call outcome: Left voicemail    Xolair - Working on PA.

## 2023-04-28 NOTE — TELEPHONE ENCOUNTER
----- Message from LIEN Perdomo III, MD sent at 4/27/2023 11:30 AM CDT -----  Regarding: RE: Diagnosis code for Xolair  The diagnosis is chronic idiopathic urticaria and angioedema.  However she has the type that only has angioedema without urticaria.  Please let me know if we need to discuss.  ED  ----- Message -----  From: Patricio Benjamin, PharmD  Sent: 4/27/2023  10:23 AM CDT  To: LIEN Perdomo III, MD, #  Subject: Diagnosis code for Xolair                        Hi Dr. Perdomo and Team:     I see that under Ms. Pham Almonte's chart visit her diagnosis lists angioedema but does not list Chronic Spontaneous Uritcaria; however, I do see it mentioned in the assessment. Can you addend the diagnosis of the visit so that I may submit a prior authorization based on this FDA approval?  I will wait to submit the PA based on your response.    Thank you in advance,  .sig

## 2023-05-04 ENCOUNTER — TELEPHONE (OUTPATIENT)
Dept: NEUROLOGY | Facility: CLINIC | Age: 53
End: 2023-05-04
Payer: COMMERCIAL

## 2023-05-04 ENCOUNTER — TELEPHONE (OUTPATIENT)
Dept: ALLERGY | Facility: CLINIC | Age: 53
End: 2023-05-04
Payer: COMMERCIAL

## 2023-05-04 NOTE — TELEPHONE ENCOUNTER
I received a message that pt was looking to be scheduled for memory difficulties. Offered pt an appt w resident Dr. Perkins on 5/31 at 2pm. I explained that this appt would be in [place] and pt verbalized understanding. Pt stated she had originally requested an appt w Dr. Mccall, since she has clinic in [place], where the pt lives. I informed pt that she already had an appt w Dr. Mccall on 6/22 and I was calling to offer an earlier appt. Pt was attempting to decide which appt to take, so I provided pt w number to Dr. Mccall's office in [place], and to the neuro office in [place]. I instructed pt to call the office in [place] to confirm the appt and see about an earlier one, and I informed her that she can call back our office if she decides to be scheduled at an earlier date in [place]. Pt was agreeable and verbalized understanding.

## 2023-05-04 NOTE — TELEPHONE ENCOUNTER
----- Message from Patricio Benjamin PharmD sent at 4/28/2023 12:04 PM CDT -----  Regarding: RE: Diagnosis code for Xolair  Hi Dr. Perdomo and Team:     I am writing to inform that the PA for Sagrario Almonte's Xolair has been denied due to:      Coverage is provided in situations where the patient has had urticaria for more than 6   weeks (prior to treatment with Xolair), with symptoms present more than 3 days per   week despite daily non-sedating H1-antihistamine therapy with doses that have been   titrated up to the maximum of 4 times the standard FDA-approved dose    I see where you recommended zyrtec 10mg 4 times daily at most recent visit. Would you like me to submit an appeal after 6 weeks of therapy?    Patricio Benjamin PharmD  Clinical Pharmacist/Supervisor   [address] [place] [id number]  [phone]    ----- Message -----  From: LIEN Perdomo III, MD  Sent: 4/27/2023  11:31 AM CDT  To: Patricio Benjamin PharmD, Conor Parker III Staff  Subject: RE: Diagnosis code for Xolair                    The diagnosis is chronic idiopathic urticaria and angioedema.  However she has the type that only has angioedema without urticaria.  Please let me know if we need to discuss.  ED  ----- Message -----  From: Patricio Benjamin PharmD  Sent: 4/27/2023  10:23 AM CDT  To: LIEN Perdomo III, MD, #  Subject: Diagnosis code for Xolair                        Hi Dr. Perdomo and Team:     I see that under Ms. Pham Almonte's chart visit her diagnosis lists angioedema but does not list Chronic Spontaneous Uritcaria; however, I do see it mentioned in the assessment. Can you addend the diagnosis of the visit so that I may submit a prior authorization based on this FDA approval?  I will wait to submit the PA based on your response.    Thank you in advance,  .sig

## 2023-06-06 ENCOUNTER — TELEPHONE (OUTPATIENT)
Dept: ALLERGY | Facility: CLINIC | Age: 53
End: 2023-06-06
Payer: COMMERCIAL

## 2023-06-06 DIAGNOSIS — T78.3XXD ANGIOEDEMA, SUBSEQUENT ENCOUNTER: Primary | ICD-10-CM

## 2023-06-06 NOTE — TELEPHONE ENCOUNTER
"----- Message from Racheal Fischer PharmD sent at 6/6/2023 11:47 AM CDT -----  Regarding: Xolair  Good morning Dr. Perdomo and Staff,     Xolair PA has been approved under the patient's pharmacy benefit, however, [facility] is out of network. Please process Xolair under medical via [employer] as the first 3 injections will need to be completed in office. Please also reach out to the patient at your earliest convenience to schedule her injections.  If you would like to approve the patient for at-home administration starting the 4th dose onward, please send a new prescription to [facility]. [facility] will close the referral for Xolair at this time.  Below is the instructions on how to place an order. If you have any questions, please reach out to [facility]: [phone]      Instructions for placing an order through [facility]:    1) Create an Orders Only encounter in advance of the patient's scheduled visit for medication administration. The pre-service team needs advanced warning to gain authorization.  Within the orders only encounter, order "medication pre-authorization"    2) Enter the required fields, associate a diagnosis, and sign the order. Close the encounter.    3) An authorization (referral) will be generated to the pre-service team to be validated. You can check the status of the referral by going to Chart Review for the patient and clicking the Referrals tab. When generated it will show a status of "PEND". The status will update after the pre-service works the authorization.  If you have any other questions or need more guidance [facility] will be able to assist you.    [facility] [phone].      Thank you,  Racheal Fischer PharmD  Clinical Pharmacist, Financial Assistance Team  [facility]  (P): [phone]  (F): [phone]        "

## 2023-06-20 NOTE — PROGRESS NOTES
Name: Sagrario Almonte  MRN: [id number]   CSN: [id number]      Date: 2023    Referring physician:  Self    Chief Complaint / Interval History: Memory Decline    History of Present Illness (HPI):  Ms. Almonte is a 53 yo woman who presents for evaluation of memory decline and headaches. Her memory issues began following an aneurysm repair in 2006. Prior to surgery she was very high functioing but can not longer operate at that level. She reportedly had 2 seizures post-operatively but is no longer on seizure medications. She describes difficulty with remembering names, where she is driving to, dates/appointments and she has forgotten how to dance when asked to do so in the past. Unclear if she has had NPT. Thryoid function is being monitored. She does have angiodema for which she requires high dose antihistamines daily. She also does a lot of snoring at night and occasionally will wake herself. She wakes up frequently with headaches. She has tried to have a sleep study but reportedly did not sleep enough and was sent home. Never tried home study. She does not feel that she has a lot of anxiety. She manages her finances, does her own meal prep and is still driving. She goes to sleep around 11-12 but wakes up at 4am.    Past Medical History: The patient  has a past medical history of Abnormal Pap smear (), Abnormal Pap smear of cervix, Fibromyalgia, Migraines, and Thyroid disease.    Relevant Surgical History:   Past Surgical History:   Procedure Laterality Date    BREAST RECONSTRUCTION      Augmentation    CERVICAL BIOPSY  W/ LOOP ELECTRODE EXCISION      Pt unknown of results.     SECTION      x2    CHOLECYSTECTOMY  2012    CRANIOTOMY      FOOT SURGERY      Right    TUBAL LIGATION         Social History: The patient  reports that she has never smoked. She has never used smokeless tobacco. She reports current alcohol use. She reports that she does not use drugs.    Family History: Their  family history includes Cancer (age of onset: 29) in her mother; Cancer (age of onset: 58) in her father; Cancer (age of onset: 70) in her maternal grandmother; Cancer (age of onset: 83) in her maternal grandfather.    Allergies: Adhesive and Bextra [valdecoxib]     Meds:   Current Outpatient Medications on File Prior to Visit   Medication Sig Dispense Refill    DEXILANT 60 mg capsule Take 60 mg by mouth once daily at 6am.  6    hydroCHLOROthiazide (HYDRODIURIL) 25 MG tablet Take 25 mg by mouth once daily.      hydrOXYzine HCL (ATARAX) 25 MG tablet Take 1 tablet (25 mg total) by mouth 3 (three) times daily as needed for Itching. 60 tablet 4    levothyroxine (SYNTHROID) 25 MCG tablet Take 25 mcg by mouth once daily.      EPINEPHrine (EPIPEN 2-BEULAH) 0.3 mg/0.3 mL AtIn Inject 0.3 mLs (0.3 mg total) into the muscle once. for 1 dose (Patient not taking: Reported on 6/22/2023) 1 each 2    omalizumab (XOLAIR) 150 mg/mL injection Inject 2 mLs (300 mg total) into the skin every 28 days. for 13 doses (Patient not taking: Reported on 6/22/2023) 2 mL 13    [DISCONTINUED] albuterol (PROVENTIL/VENTOLIN HFA) 90 mcg/actuation inhaler Inhale 1-2 puffs into the lungs every 6 (six) hours as needed. Rescue 6.7 g 0    [DISCONTINUED] azithromycin (Z-BEULAH) 250 MG tablet Z-PACK AS DIRECTED 6 tablet 0    [DISCONTINUED] citalopram (CELEXA) 20 MG tablet Take 1 tablet (20 mg total) by mouth once daily. 30 tablet 12    [DISCONTINUED] famotidine (PEPCID) 20 MG tablet Take 2 tablets (40 mg total) by mouth 2 (two) times daily. for 10 days 40 tablet 0    [DISCONTINUED] fremanezumab-vfrm (AJOVY) 225 mg/1.5 mL injection Ajovy 225 mg/1.5 mL subcutaneous syringe   3 INJECTIONS ALL AT ONCE EVERY 3 MONTHS      [DISCONTINUED] hydrOXYzine HCL (ATARAX) 10 MG Tab Take 1 tablet (10 mg total) by mouth 3 (three) times daily as needed. 90 tablet 2    [DISCONTINUED] hydrOXYzine HCL (ATARAX) 25 MG tablet Take 1 tablet by mouth twice daily 60 tablet 2     "[DISCONTINUED] methylPREDNISolone (MEDROL DOSEPACK) 4 mg tablet As directed 1 Package 0    [DISCONTINUED] oxybutynin (DITROPAN) 5 MG Tab Take 1 tablet by mouth twice daily 60 tablet 0    [DISCONTINUED] pantoprazole (PROTONIX) 40 MG tablet Take 1 tablet (40 mg total) by mouth once daily. 30 tablet 0    [DISCONTINUED] promethazine-dextromethorphan (PROMETHAZINE-DM) 6.25-15 mg/5 mL Syrp Take 5 mLs by mouth every 6 (six) hours as needed. 100 mL 0    [DISCONTINUED] propranolol (INDERAL) 10 MG tablet propranolol 10 mg tablet   Take 1 tablet 3 times a day by oral route as needed for 30 days.      [DISCONTINUED] zonisamide (ZONEGRAN) 100 MG Cap Take 200 mg by mouth once daily.       No current facility-administered medications on file prior to visit.       Exam:  BP (!) 128/90 (BP Location: Right arm, Patient Position: Sitting, BP Method: Medium (Manual))   Pulse 86   Ht 5' 1" (1.549 m)   Wt 80.2 kg (176 lb 12.9 oz)   BMI 33.41 kg/m²     Constitutional  Well-developed, well-nourished, appears stated age, tearful    Cardiovascular  No LE edema bilaterally   Neurological    * Mental status  MOCA = 29/30 (inattention due to missed work on sentence recall). 11 F words and 15 animals all in normal organization.      - Orientation  Oriented to conversation     - Memory   Intact recent and remote     - Attention/concentration  Attentive, vigilant during exam     - Language  Intact to conversation.     - Fund of knowledge  Aware of current events     - Executive  Well-organized thoughts     - Other     * Cranial nerves       - CN II  Pupils equal, visual fields full to confrontation     - CN III, IV, VI  Extraocular movements full, normal pursuits and saccades     - CN V  Reduced sensation to LT in the right face     - CN VII  Very subtle right facial weakness     - CN VIII  Hearing intact bilaterally         - CN XI  SCM and trapezius 5/5 bilaterally       * Motor  Muscle bulk normal, strength 5/5 throughout   * Sensory   " Intact to light touch throughout   * Coordination  No dysmetria with finger-to-nose   * Gait  See below.   * Deep tendon reflexes  2+ and symmetric throughout         Medical Record Review:  Labs, imaging and prior notes reviewed independently.     No recent MRI.     Diagnoses:          1. Memory change  Ambulatory referral/consult to Neurology    Ambulatory referral/consult to Adult Neuropsychology      2. Insomnia, unspecified type  Ambulatory referral/consult to Sleep Disorders      3. History of aneurysm  MRI Brain Without Contrast      4. History of migraine headaches            Assessment:  Ms. Almonte is a 51 yo who presents with cognitive decline and headaches. She s/p aneurysmal repair in 2006 with memory changes noted since. She scored a 29/30 on MOCA today. She does not fit the typical clinical picture of a neurodegenerative process causing her memory loss such as AD. I reassured her that a lot of her cognitive issues are likely related to her antihistamine use, poor sleep and possible sleep apnea. There is likely also an element of anxiety. As for her headaches, I'd like to see what sleep study reveals and MRI brain before treatment.     Plan:  - Sleep consult placed in hopes of ruling out DARREL.   - NPT orders placed.   - She was told she should have surveillance MRI brain however she has been lost to follow-up. I have ordered MRI brain today.   - For headaches, increase hydration, avoid overuse of OTC meds, begin daily exercise, consider B complex and magnesium supplementation.     RTC in 3 months to see me.     Total time: 60 minutes spent on the encounter, which includes face to face time and non-face to face time preparing to see the patient (eg, review of tests), Obtaining and/or reviewing separately obtained history, Documenting clinical information in the electronic or other health record, Independently interpreting results (not separately reported) and communicating results to the  patient/family/caregiver, or Care coordination (not separately reported).     Maddison Mccall MD  Division of Movement and Memory Disorders  [facility]  [phone]

## 2023-06-22 ENCOUNTER — OFFICE VISIT (OUTPATIENT)
Dept: NEUROLOGY | Facility: CLINIC | Age: 53
End: 2023-06-22
Payer: COMMERCIAL

## 2023-06-22 VITALS
BODY MASS INDEX: 33.38 KG/M2 | WEIGHT: 176.81 LBS | HEART RATE: 86 BPM | HEIGHT: 61 IN | DIASTOLIC BLOOD PRESSURE: 90 MMHG | SYSTOLIC BLOOD PRESSURE: 128 MMHG

## 2023-06-22 DIAGNOSIS — Z86.79 HISTORY OF ANEURYSM: ICD-10-CM

## 2023-06-22 DIAGNOSIS — Z86.69 HISTORY OF MIGRAINE HEADACHES: ICD-10-CM

## 2023-06-22 DIAGNOSIS — R41.3 MEMORY CHANGE: Primary | ICD-10-CM

## 2023-06-22 DIAGNOSIS — G47.00 INSOMNIA, UNSPECIFIED TYPE: ICD-10-CM

## 2023-06-22 PROCEDURE — 3008F PR BODY MASS INDEX (BMI) DOCUMENTED: ICD-10-PCS | Mod: CPTII,S$GLB,, | Performed by: STUDENT IN AN ORGANIZED HEALTH CARE EDUCATION/TRAINING PROGRAM

## 2023-06-22 PROCEDURE — 3080F DIAST BP >= 90 MM HG: CPT | Mod: CPTII,S$GLB,, | Performed by: STUDENT IN AN ORGANIZED HEALTH CARE EDUCATION/TRAINING PROGRAM

## 2023-06-22 PROCEDURE — 99999 PR PBB SHADOW E&M-EST. PATIENT-LVL V: ICD-10-PCS | Mod: PBBFAC,,, | Performed by: STUDENT IN AN ORGANIZED HEALTH CARE EDUCATION/TRAINING PROGRAM

## 2023-06-22 PROCEDURE — 99205 OFFICE O/P NEW HI 60 MIN: CPT | Mod: S$GLB,,, | Performed by: STUDENT IN AN ORGANIZED HEALTH CARE EDUCATION/TRAINING PROGRAM

## 2023-06-22 PROCEDURE — 99205 PR OFFICE/OUTPT VISIT, NEW, LEVL V, 60-74 MIN: ICD-10-PCS | Mod: S$GLB,,, | Performed by: STUDENT IN AN ORGANIZED HEALTH CARE EDUCATION/TRAINING PROGRAM

## 2023-06-22 PROCEDURE — 3008F BODY MASS INDEX DOCD: CPT | Mod: CPTII,S$GLB,, | Performed by: STUDENT IN AN ORGANIZED HEALTH CARE EDUCATION/TRAINING PROGRAM

## 2023-06-22 PROCEDURE — 3074F SYST BP LT 130 MM HG: CPT | Mod: CPTII,S$GLB,, | Performed by: STUDENT IN AN ORGANIZED HEALTH CARE EDUCATION/TRAINING PROGRAM

## 2023-06-22 PROCEDURE — 1160F PR REVIEW ALL MEDS BY PRESCRIBER/CLIN PHARMACIST DOCUMENTED: ICD-10-PCS | Mod: CPTII,S$GLB,, | Performed by: STUDENT IN AN ORGANIZED HEALTH CARE EDUCATION/TRAINING PROGRAM

## 2023-06-22 PROCEDURE — 3074F PR MOST RECENT SYSTOLIC BLOOD PRESSURE < 130 MM HG: ICD-10-PCS | Mod: CPTII,S$GLB,, | Performed by: STUDENT IN AN ORGANIZED HEALTH CARE EDUCATION/TRAINING PROGRAM

## 2023-06-22 PROCEDURE — 1160F RVW MEDS BY RX/DR IN RCRD: CPT | Mod: CPTII,S$GLB,, | Performed by: STUDENT IN AN ORGANIZED HEALTH CARE EDUCATION/TRAINING PROGRAM

## 2023-06-22 PROCEDURE — 1159F PR MEDICATION LIST DOCUMENTED IN MEDICAL RECORD: ICD-10-PCS | Mod: CPTII,S$GLB,, | Performed by: STUDENT IN AN ORGANIZED HEALTH CARE EDUCATION/TRAINING PROGRAM

## 2023-06-22 PROCEDURE — 3080F PR MOST RECENT DIASTOLIC BLOOD PRESSURE >= 90 MM HG: ICD-10-PCS | Mod: CPTII,S$GLB,, | Performed by: STUDENT IN AN ORGANIZED HEALTH CARE EDUCATION/TRAINING PROGRAM

## 2023-06-22 PROCEDURE — 1159F MED LIST DOCD IN RCRD: CPT | Mod: CPTII,S$GLB,, | Performed by: STUDENT IN AN ORGANIZED HEALTH CARE EDUCATION/TRAINING PROGRAM

## 2023-06-22 PROCEDURE — 99999 PR PBB SHADOW E&M-EST. PATIENT-LVL V: CPT | Mod: PBBFAC,,, | Performed by: STUDENT IN AN ORGANIZED HEALTH CARE EDUCATION/TRAINING PROGRAM

## 2023-06-29 ENCOUNTER — HOSPITAL ENCOUNTER (OUTPATIENT)
Dept: RADIOLOGY | Facility: HOSPITAL | Age: 53
Discharge: HOME | End: 2023-06-29
Attending: STUDENT IN AN ORGANIZED HEALTH CARE EDUCATION/TRAINING PROGRAM
Payer: COMMERCIAL

## 2023-06-29 ENCOUNTER — TELEPHONE (OUTPATIENT)
Dept: NEUROLOGY | Facility: CLINIC | Age: 53
End: 2023-06-29
Payer: COMMERCIAL

## 2023-06-29 DIAGNOSIS — Z86.79 HISTORY OF ANEURYSM: ICD-10-CM

## 2023-06-29 PROCEDURE — 70551 MRI BRAIN STEM W/O DYE: CPT | Mod: TC

## 2023-06-29 PROCEDURE — 70551 MRI BRAIN STEM W/O DYE: CPT | Mod: 26,,, | Performed by: RADIOLOGY

## 2023-06-29 PROCEDURE — 70551 MRI BRAIN WITHOUT CONTRAST: ICD-10-PCS | Mod: 26,,, | Performed by: RADIOLOGY

## 2023-06-29 NOTE — TELEPHONE ENCOUNTER
----- Message from Maddison Mccall MD sent at 6/29/2023  2:43 PM CDT -----  Please let patient know that her MRI looks great. Nothing new or concerning. Thanks

## 2023-07-10 ENCOUNTER — TELEPHONE (OUTPATIENT)
Dept: ALLERGY | Facility: CLINIC | Age: 53
End: 2023-07-10
Payer: COMMERCIAL

## 2023-07-28 ENCOUNTER — DOCUMENTATION ONLY (OUTPATIENT)
Dept: ALLERGY | Facility: CLINIC | Age: 53
End: 2023-07-28
Payer: COMMERCIAL

## 2023-08-14 ENCOUNTER — HOSPITAL ENCOUNTER (EMERGENCY)
Facility: HOSPITAL | Age: 53
Discharge: HOME | End: 2023-08-14
Attending: SURGERY
Payer: COMMERCIAL

## 2023-08-14 VITALS
SYSTOLIC BLOOD PRESSURE: 142 MMHG | HEART RATE: 104 BPM | OXYGEN SATURATION: 98 % | RESPIRATION RATE: 18 BRPM | WEIGHT: 172.5 LBS | TEMPERATURE: 99 F | BODY MASS INDEX: 32.6 KG/M2 | DIASTOLIC BLOOD PRESSURE: 92 MMHG

## 2023-08-14 DIAGNOSIS — R50.9 COUGH WITH FEVER: ICD-10-CM

## 2023-08-14 DIAGNOSIS — R05.9 COUGH WITH FEVER: ICD-10-CM

## 2023-08-14 DIAGNOSIS — U07.1 COVID-19: Primary | ICD-10-CM

## 2023-08-14 LAB — SARS-COV-2 RDRP RESP QL NAA+PROBE: POSITIVE

## 2023-08-14 PROCEDURE — 99284 EMERGENCY DEPT VISIT MOD MDM: CPT | Mod: 25

## 2023-08-14 PROCEDURE — U0002 COVID-19 LAB TEST NON-CDC: HCPCS | Performed by: NURSE PRACTITIONER

## 2023-08-14 RX ORDER — DEXAMETHASONE SODIUM PHOSPHATE 4 MG/ML
8 INJECTION, SOLUTION INTRA-ARTICULAR; INTRALESIONAL; INTRAMUSCULAR; INTRAVENOUS; SOFT TISSUE
Status: DISCONTINUED | OUTPATIENT
Start: 2023-08-14 | End: 2023-08-14 | Stop reason: HOSPADM

## 2023-08-14 RX ORDER — PROMETHAZINE HYDROCHLORIDE AND DEXTROMETHORPHAN HYDROBROMIDE 6.25; 15 MG/5ML; MG/5ML
5 SYRUP ORAL EVERY 6 HOURS PRN
Qty: 100 ML | Refills: 0 | Status: SHIPPED | OUTPATIENT
Start: 2023-08-14

## 2023-08-14 RX ORDER — ALBUTEROL SULFATE 90 UG/1
1-2 AEROSOL, METERED RESPIRATORY (INHALATION) EVERY 6 HOURS PRN
Qty: 6.7 G | Refills: 0 | Status: SHIPPED | OUTPATIENT
Start: 2023-08-14

## 2023-08-14 NOTE — Clinical Note
"Sagrario Almonte (Anita) was seen and treated in our emergency department on 8/14/2023.     COVID-19 is present in our communities across the state. There is limited testing for COVID at this time, so not all patients can be tested. In this situation, your employee meets the following criteria:    Sagrario Almonte has met the criteria for COVID-19 testing and has a POSITIVE result. She can return to work once they are asymptomatic for 24 hours without the use of fever reducing medications AND at least five days from the first positive result. A mask is recommended for 5 days post quarantine.     If you have any questions or concerns, or if I can be of further assistance, please do not hesitate to contact me.    Sincerely,             BELLA Germain III, MD"

## 2023-08-14 NOTE — ED PROVIDER NOTES
Encounter Date: 2023       History     Chief Complaint   Patient presents with    General Illness     Patient to ER CC of coughing, chills, fever, headaches, sore throat, and pain all over for 4 days ago she reports she tested positive for COVID with a home test     Sagrario Almonte is a 53 y.o. female with PMH of thyroid disease of 5 around a who presents the ED for evaluation of URI symptoms.  Patient presents with a 4 day history of nasal congestion, fever, generalized headache, sore throat, and cough.  She reports that she had a positive home COVID test.  She denies chest pain or shortness of breath.  She does report previous history of COVID-19 viral infection.    The history is provided by the patient.     Review of patient's allergies indicates:   Allergen Reactions    Adhesive     Bextra [valdecoxib]      Past Medical History:   Diagnosis Date    Abnormal Pap smear     Pt unknown of results.     Abnormal Pap smear of cervix     Fibromyalgia     Migraines     Thyroid disease      Past Surgical History:   Procedure Laterality Date    BREAST RECONSTRUCTION      Augmentation    CERVICAL BIOPSY  W/ LOOP ELECTRODE EXCISION      Pt unknown of results.     SECTION      x2    CHOLECYSTECTOMY  2012    CRANIOTOMY      FOOT SURGERY      Right    TUBAL LIGATION       Family History   Problem Relation Age of Onset    Cancer Maternal Grandmother 70        Stomach, Throat & Brain    Cancer Maternal Grandfather 83        Renal Cancer    Cancer Father 58        Lung Cancer    Cancer Mother 29        Uterine Cancer    Ovarian cancer Neg Hx     Colon cancer Neg Hx     Breast cancer Neg Hx      Social History     Tobacco Use    Smoking status: Never    Smokeless tobacco: Never   Substance Use Topics    Alcohol use: Yes     Comment: Socially    Drug use: No     Review of Systems   Constitutional:  Positive for activity change, chills and fever.   HENT:  Positive for congestion and sore throat.     Respiratory:  Positive for cough. Negative for chest tightness and shortness of breath.    Cardiovascular:  Negative for chest pain.   Gastrointestinal:  Negative for abdominal pain and nausea.   Genitourinary:  Negative for dysuria, frequency and urgency.   Musculoskeletal:  Positive for myalgias. Negative for back pain.   Skin:  Negative for rash.   Neurological:  Negative for dizziness, weakness, light-headedness and numbness.   Hematological:  Does not bruise/bleed easily.       Physical Exam     Initial Vitals [08/14/23 1740]   BP Pulse Resp Temp SpO2   (!) 142/92 104 18 98.6 °F (37 °C) 98 %      MAP       --         Physical Exam    Nursing note and vitals reviewed.  Constitutional: She appears well-developed and well-nourished.   HENT:   Head: Normocephalic and atraumatic.   Right Ear: Tympanic membrane, external ear and ear canal normal. Tympanic membrane is not erythematous. No middle ear effusion.   Left Ear: Tympanic membrane, external ear and ear canal normal. Tympanic membrane is not erythematous.  No middle ear effusion.   Nose: Mucosal edema and rhinorrhea present.   Mouth/Throat: Uvula is midline and mucous membranes are normal. Mucous membranes are not pale and not dry. Posterior oropharyngeal erythema present.   Eyes: Conjunctivae and EOM are normal. Pupils are equal, round, and reactive to light.   Neck: Neck supple.   Normal range of motion.  Cardiovascular:  Normal rate, regular rhythm, normal heart sounds and intact distal pulses.           Pulmonary/Chest: Effort normal and breath sounds normal. She has no decreased breath sounds. She has no wheezes. She has no rhonchi. She has no rales.   Abdominal: Abdomen is soft. Bowel sounds are normal. There is no abdominal tenderness.   Musculoskeletal:         General: Normal range of motion.      Cervical back: Normal range of motion and neck supple.     Neurological: She is alert and oriented to person, place, and time. She has normal strength. She  displays normal reflexes. No cranial nerve deficit or sensory deficit.   Skin: Skin is warm and dry. Capillary refill takes less than 2 seconds. No rash noted.   Psychiatric: She has a normal mood and affect. Her behavior is normal. Judgment and thought content normal.         ED Course   Procedures  Labs Reviewed   SARS-COV-2 RNA AMPLIFICATION, QUAL - Abnormal; Notable for the following components:       Result Value    SARS-CoV-2 RNA, Amplification, Qual Positive (*)     All other components within normal limits          Imaging Results              X-Ray Chest AP Portable (Final result)  Result time 08/14/23 18:44:08      Final result by Douglas Diana IV, MD (08/14/23 18:44:08)                   Impression:      No acute cardiopulmonary process convincingly noted.      Electronically signed by: Douglas Diana MD  Date:    08/14/2023  Time:    18:44               Narrative:    EXAMINATION:  XR CHEST AP PORTABLE    CLINICAL HISTORY:  Cough, unspecified    TECHNIQUE:  Single frontal view of the chest was performed.    COMPARISON:  09/18/2020    FINDINGS:  Evidence of breast augmentation is noted.  No convincing confluent consolidations are noted.  No worrisome detrimental changes are noted since the prior.  The cardiac silhouette appears appropriate.                                       Medications   dexAMETHasone injection 8 mg (has no administration in time range)     Medical Decision Making:   Differential Diagnosis:   Influenza, COVID, strep, bronchitis, pneumonia  Clinical Tests:   Lab Tests: Ordered and Reviewed  Radiological Study: Ordered and Reviewed  ED Management:  Evaluation of a 53-year-old female with URI symptoms with positive home COVID test.  She presents with mucosal edema, postnasal drip.  Breath sounds are clear on exam.  COVID positive.  Chest x-ray is clear.  Decadron injection given in the ED for symptom control.  Will be discharged home with symptomatic treatment and close follow-up.  Self  quarantine per CDC guidelines. Patient/caregiver voices understanding and feels comfortable with discharge plan.      The patient acknowledges that close follow up with medical provider is required. Instructed to follow up with PCP within 2 days. Patient was given specific return precautions. The patient agrees to comply with all instruction and directions given in the ER.                            Clinical Impression:   Final diagnoses:  [R05.9, R50.9] Cough with fever  [U07.1] COVID-19 (Primary)        ED Disposition Condition    Discharge Stable          ED Prescriptions       Medication Sig Dispense Start Date End Date Auth. Provider    promethazine-dextromethorphan (PROMETHAZINE-DM) 6.25-15 mg/5 mL Syrp Take 5 mLs by mouth every 6 (six) hours as needed (cough). 100 mL 8/14/2023 -- Ella Davis NP    albuterol (PROVENTIL/VENTOLIN HFA) 90 mcg/actuation inhaler Inhale 1-2 puffs into the lungs every 6 (six) hours as needed for Wheezing or Shortness of Breath. Rescue 6.7 g 8/14/2023 -- Ella Davis NP          Follow-up Information       Follow up With Specialties Details Why Contact Info    Yadi Pratt MD Family Medicine Schedule an appointment as soon as possible for a visit in 1 week  [id number]  [facility] [address]  [phone]               Ella Davis NP  08/14/23 [id number]

## 2023-10-23 DIAGNOSIS — G47.30 SLEEP APNEA, UNSPECIFIED: Primary | ICD-10-CM

## 2023-10-26 ENCOUNTER — HOSPITAL ENCOUNTER (OUTPATIENT)
Dept: SLEEP MEDICINE | Facility: HOSPITAL | Age: 53
Discharge: HOME | End: 2023-10-26
Attending: FAMILY MEDICINE
Payer: COMMERCIAL

## 2023-10-26 DIAGNOSIS — G47.30 SLEEP APNEA, UNSPECIFIED: ICD-10-CM

## 2023-10-26 PROCEDURE — 95806 SLEEP STUDY UNATT&RESP EFFT: CPT

## 2023-10-30 PROBLEM — G47.30 SLEEP APNEA, UNSPECIFIED: Status: ACTIVE | Noted: 2023-10-30

## 2024-01-29 ENCOUNTER — HOSPITAL ENCOUNTER (OUTPATIENT)
Dept: RADIOLOGY | Facility: HOSPITAL | Age: 54
Discharge: HOME | End: 2024-01-29
Attending: NURSE PRACTITIONER
Payer: COMMERCIAL

## 2024-01-29 VITALS — WEIGHT: 172 LBS | BODY MASS INDEX: 32.47 KG/M2 | HEIGHT: 61 IN

## 2024-01-29 DIAGNOSIS — Z12.31 ENCOUNTER FOR SCREENING MAMMOGRAM FOR MALIGNANT NEOPLASM OF BREAST: ICD-10-CM

## 2024-01-29 PROCEDURE — 77067 SCR MAMMO BI INCL CAD: CPT | Mod: TC

## 2024-01-29 PROCEDURE — 77067 SCR MAMMO BI INCL CAD: CPT | Mod: 26,,, | Performed by: RADIOLOGY

## 2024-01-29 PROCEDURE — 77063 BREAST TOMOSYNTHESIS BI: CPT | Mod: 26,,, | Performed by: RADIOLOGY

## 2024-01-29 RX ORDER — HYDROXYZINE HYDROCHLORIDE 25 MG/1
25 TABLET, FILM COATED ORAL
Qty: 60 TABLET | Refills: 2 | Status: SHIPPED | OUTPATIENT
Start: 2024-01-29 | End: 2024-05-28

## 2024-05-28 RX ORDER — HYDROXYZINE HYDROCHLORIDE 25 MG/1
25 TABLET, FILM COATED ORAL
Qty: 60 TABLET | Refills: 0 | Status: SHIPPED | OUTPATIENT
Start: 2024-05-28

## 2024-07-10 RX ORDER — HYDROXYZINE HYDROCHLORIDE 25 MG/1
25 TABLET, FILM COATED ORAL
Qty: 60 TABLET | Refills: 0 | Status: SHIPPED | OUTPATIENT
Start: 2024-07-10

## 2024-09-11 RX ORDER — HYDROXYZINE HYDROCHLORIDE 25 MG/1
25 TABLET, FILM COATED ORAL
Qty: 60 TABLET | Refills: 0 | OUTPATIENT
Start: 2024-09-11

## 2024-09-12 RX ORDER — HYDROXYZINE HYDROCHLORIDE 25 MG/1
25 TABLET, FILM COATED ORAL 3 TIMES DAILY PRN
Qty: 60 TABLET | Refills: 0 | Status: SHIPPED | OUTPATIENT
Start: 2024-09-12

## 2024-10-09 NOTE — PROGRESS NOTES
Prior authorization for Xolair: Approved  Patient ID#[id number]  Request [id number]  Authorization number [id number]     - follows with Dr. Montano, missed monthly dose of Adakveo for 2 months which may explain SCC  - low concern for acute chest given no opacities on CXR, chest pain more consistent with SCC  - transaminitis likely 2/2 to hepatopathy iso vasoocclusive crisis, no abdominal pain and LFTs improving, could otherwise consider RUQ US  - palliative deferred pain management to primary team   - s/p 1 U PRBC 9/19, 9/22, 9/26  - pain has little improvement and patient consistently reports worse pain when sleeping: will consider switching to long-acting PO medication   - XRs show no sign of AVNs    A/P:  - Heme consults appreciated  - restarted hydrea (10/2) home dose 1000mg weekdays, 1500mg weekends  - IVF 1/2 NS @150cc/hr  - PCA pump downtitrated to 0.75mg, tylenol 650mg q6h (s/p toradol 15mg q6h w/ pO protonix)  - c/w 30 mg ERBID Morphine (home medication 15 mg ER BID, would like to go home on home dose when DC)    - C/w humidified O2 for pain  - bowel regimen: miralax BID, senna 2 MG PRN  - d/c oxbryta iso of recall, will follow up with o/p Dr. Montano  - incentive spirometer  - will continue to monitor for signs of acute chest syndrome. If fever, culture and repeat CXR stat - follows with Dr. Montano, missed monthly dose of Adakveo for 2 months which may explain SCC  - low concern for acute chest given no opacities on CXR, chest pain more consistent with SCC  - transaminitis likely 2/2 to hepatopathy iso vasoocclusive crisis, no abdominal pain and LFTs improving, could otherwise consider RUQ US  - palliative deferred pain management to primary team   - s/p 1 U PRBC 9/19, 9/22, 9/26  - pain has little improvement and patient consistently reports worse pain when sleeping: will consider switching to long-acting PO medication   - XRs show no sign of AVNs    A/P:  - Heme consults appreciated  - restarted hydrea (10/2) home dose 1000mg weekdays, 1500mg weekends  - IVF 1/2 NS @150cc/hr  - PCA pump downtitrated to 0.75mg, tylenol 650mg q6h (s/p toradol 15mg q6h w/ pO protonix)  - c/w 30 mg ERBID Morphine (home medication 15 mg ER BID, 15mg IR q8h would like to go home on home dose when DC)    - C/w humidified O2 for pain  - bowel regimen: miralax BID, senna 2 MG PRN  - d/c oxbryta iso of recall, will follow up with o/p Dr. Montano  - incentive spirometer  - will continue to monitor for signs of acute chest syndrome. If fever, culture and repeat CXR stat

## 2024-11-26 ENCOUNTER — PATIENT MESSAGE (OUTPATIENT)
Dept: ALLERGY | Facility: CLINIC | Age: 54
End: 2024-11-26
Payer: COMMERCIAL

## 2025-01-27 ENCOUNTER — TELEPHONE (OUTPATIENT)
Dept: ALLERGY | Facility: CLINIC | Age: 55
End: 2025-01-27
Payer: COMMERCIAL

## 2025-01-27 NOTE — TELEPHONE ENCOUNTER
"Phone call returned to patient. Patient states, "Daily I have swelling. It's either my eyes, lip, mouth or jaw." Patient denies any difficulty breathing/throat swelling at any episode.  Patient has an epipen   I offered patient a visit with Dr. Perdomo tomorrow. Patient declined. " My  uses my car so I am only available on Fridays." I asked patient what medications she currently was on for the angioedema. Patient states she is only taking 50 mg of atarax nightly. I informed patient that Dr. Perdomo's recommendation at last visit stated to take Zyrtec 10 mg daily. " Oh I forgot he added that. I haven't been taking it. Dr Perdomo also sent in a medication for me to take monthly but I never heard back from his office. I have called and left several messages with no return calls. The pharmacy even told me it was approved." I informed patient we can further discuss this at her upcoming appointment.   "

## 2025-01-27 NOTE — TELEPHONE ENCOUNTER
----- Message from Angeli sent at 1/27/2025  2:30 PM CST -----    Name of Caller:KELIN ABEL [[id number]]      When is the first available appointment?      Symptoms:Symptoms: Eye Swelling, Face Swelling  Outcome: Talk to a nurse or provider within 15 minutes.  Reason: Eyelid is red and swollen    The caller accepted this outcome. Yes       Best Call Back Number Telephone Information:  Mobile          [phone]

## 2025-02-02 ENCOUNTER — HOSPITAL ENCOUNTER (EMERGENCY)
Facility: HOSPITAL | Age: 55
Discharge: HOME | End: 2025-02-02
Attending: STUDENT IN AN ORGANIZED HEALTH CARE EDUCATION/TRAINING PROGRAM
Payer: COMMERCIAL

## 2025-02-02 VITALS
TEMPERATURE: 98 F | OXYGEN SATURATION: 96 % | RESPIRATION RATE: 20 BRPM | HEART RATE: 98 BPM | WEIGHT: 143.88 LBS | DIASTOLIC BLOOD PRESSURE: 83 MMHG | SYSTOLIC BLOOD PRESSURE: 129 MMHG | BODY MASS INDEX: 27.18 KG/M2

## 2025-02-02 DIAGNOSIS — T78.3XXA ANGIOEDEMA, INITIAL ENCOUNTER: Primary | ICD-10-CM

## 2025-02-02 PROCEDURE — 99284 EMERGENCY DEPT VISIT MOD MDM: CPT | Mod: 25

## 2025-02-02 PROCEDURE — 96374 THER/PROPH/DIAG INJ IV PUSH: CPT

## 2025-02-02 PROCEDURE — 96361 HYDRATE IV INFUSION ADD-ON: CPT

## 2025-02-02 PROCEDURE — 25000003 PHARM REV CODE 250

## 2025-02-02 PROCEDURE — 96375 TX/PRO/DX INJ NEW DRUG ADDON: CPT

## 2025-02-02 PROCEDURE — 63600175 PHARM REV CODE 636 W HCPCS

## 2025-02-02 PROCEDURE — 96376 TX/PRO/DX INJ SAME DRUG ADON: CPT

## 2025-02-02 RX ORDER — FAMOTIDINE 10 MG/ML
20 INJECTION INTRAVENOUS
Status: COMPLETED | OUTPATIENT
Start: 2025-02-02 | End: 2025-02-02

## 2025-02-02 RX ORDER — DIPHENHYDRAMINE HYDROCHLORIDE 50 MG/ML
25 INJECTION INTRAMUSCULAR; INTRAVENOUS
Status: COMPLETED | OUTPATIENT
Start: 2025-02-02 | End: 2025-02-02

## 2025-02-02 RX ORDER — ONDANSETRON HYDROCHLORIDE 2 MG/ML
4 INJECTION, SOLUTION INTRAVENOUS
Status: COMPLETED | OUTPATIENT
Start: 2025-02-02 | End: 2025-02-02

## 2025-02-02 RX ORDER — TRANEXAMIC ACID 10 MG/ML
1000 INJECTION, SOLUTION INTRAVENOUS ONCE
Status: COMPLETED | OUTPATIENT
Start: 2025-02-02 | End: 2025-02-02

## 2025-02-02 RX ORDER — METHYLPREDNISOLONE SOD SUCC 125 MG
125 VIAL (EA) INJECTION
Status: COMPLETED | OUTPATIENT
Start: 2025-02-02 | End: 2025-02-02

## 2025-02-02 RX ADMIN — SODIUM CHLORIDE 1000 ML: 9 INJECTION, SOLUTION INTRAVENOUS at 10:02

## 2025-02-02 RX ADMIN — DIPHENHYDRAMINE HYDROCHLORIDE 25 MG: 50 INJECTION INTRAMUSCULAR; INTRAVENOUS at 10:02

## 2025-02-02 RX ADMIN — METHYLPREDNISOLONE SODIUM SUCCINATE 125 MG: 125 INJECTION, POWDER, FOR SOLUTION INTRAMUSCULAR; INTRAVENOUS at 09:02

## 2025-02-02 RX ADMIN — FAMOTIDINE 20 MG: 10 INJECTION INTRAVENOUS at 09:02

## 2025-02-02 RX ADMIN — DIPHENHYDRAMINE HYDROCHLORIDE 25 MG: 50 INJECTION INTRAMUSCULAR; INTRAVENOUS at 09:02

## 2025-02-02 RX ADMIN — TRANEXAMIC ACID 1000 MG: 10 INJECTION, SOLUTION INTRAVENOUS at 10:02

## 2025-02-02 RX ADMIN — ONDANSETRON 4 MG: 2 INJECTION INTRAMUSCULAR; INTRAVENOUS at 11:02

## 2025-02-02 NOTE — DISCHARGE INSTRUCTIONS
Please continue to follow-up allergist, other specialist.  Please return to ER immediately if you develop any worsening or concerning symptoms

## 2025-02-02 NOTE — ED PROVIDER NOTES
Encounter Date: 2025       History     Chief Complaint   Patient presents with    Facial Swelling     Pt arrives to the ed with c/o left jaw swelling x 1 week. Pt reports swelling to hands, no swelling noted. Pt had appt on Friday but was sick and cancelled appt and rescheduled next week.     This note is dictated on M*Modal word recognition program.  There are word recognition mistakes and grammatical errors that are occasionally missed on review.     Sagrario Almonte is a 54 y.o. female history of migraines fibromyalgia, thyroid disease, breast reconstructions, , craniotomy, cholecystectomy presents to ER today with complaints of facial swelling and swelling of hands.  Patient is not taking lisinopril or Arb.  Patient reports she has history of angioedema that has episodically occurred over the last 13 years because her body produces too much histamine years.  She does see an allergist.  Reports over the last week the swelling to her face has become more frequent and now her hands are also swollen and painful to move due to the increased swelling.  Patient denies any shortness of breath patient denies any chest pain patient does report cough.  She reports she is on daily medication to help control angioedema symptoms however they have not been working well this week.    The history is provided by the patient.     Review of patient's allergies indicates:   Allergen Reactions    Adhesive     Bextra [valdecoxib]      Past Medical History:   Diagnosis Date    Abnormal Pap smear     Pt unknown of results.     Abnormal Pap smear of cervix     Fibromyalgia     Migraines     Thyroid disease      Past Surgical History:   Procedure Laterality Date    BREAST RECONSTRUCTION      Augmentation    CERVICAL BIOPSY  W/ LOOP ELECTRODE EXCISION      Pt unknown of results.     SECTION      x2    CHOLECYSTECTOMY  2012    CRANIOTOMY      FOOT SURGERY      Right    TUBAL LIGATION  2001     Family  History   Problem Relation Name Age of Onset    Cancer Maternal Grandmother  70        Stomach, Throat & Brain    Cancer Maternal Grandfather  83        Renal Cancer    Cancer Father  58        Lung Cancer    Cancer Mother  29        Uterine Cancer    Ovarian cancer Neg Hx      Colon cancer Neg Hx      Breast cancer Neg Hx       Social History     Tobacco Use    Smoking status: Never    Smokeless tobacco: Never   Substance Use Topics    Alcohol use: Yes     Comment: Socially    Drug use: No     Review of Systems   HENT:  Positive for facial swelling.    Respiratory:  Positive for cough.    Musculoskeletal:  Positive for joint swelling.       Physical Exam     Initial Vitals [02/02/25 0924]   BP Pulse Resp Temp SpO2   138/87 (!) 113 20 97.7 °F (36.5 °C) 97 %      MAP       --         Physical Exam    Constitutional: She appears well-developed and well-nourished.   HENT:   Head: Normocephalic. Mouth/Throat: No oropharyngeal exudate.   There is no tongue swelling at this current time uvula midline   Eyes: Pupils are equal, round, and reactive to light. Right eye exhibits no discharge.   Neck: Neck supple.   Normal range of motion.  Cardiovascular:  Normal rate and regular rhythm.           No murmur heard.  Pulmonary/Chest: Breath sounds normal. No respiratory distress. She has no wheezes.   No stridor no wheezing on examination no respiratory distress.   Abdominal: Abdomen is soft. She exhibits no distension. There is no abdominal tenderness.   Musculoskeletal:         General: Edema present.      Cervical back: Normal range of motion and neck supple.      Comments: Patient has swelling bilateral hands on examination radial pulse 2 +     Neurological: She is alert and oriented to person, place, and time. GCS score is 15. GCS eye subscore is 4. GCS verbal subscore is 5. GCS motor subscore is 6.   Skin: Capillary refill takes less than 2 seconds.   Psychiatric: She has a normal mood and affect. Thought content normal.          ED Course   Procedures  Labs Reviewed - No data to display       Imaging Results    None          Medications   methylPREDNISolone sodium succinate injection 125 mg (125 mg Intravenous Given 2/2/25 0959)   diphenhydrAMINE injection 25 mg (25 mg Intravenous Given 2/2/25 0959)   famotidine (PF) injection 20 mg (20 mg Intravenous Given 2/2/25 0959)   sodium chloride 0.9% bolus 1,000 mL 1,000 mL (0 mLs Intravenous Stopped 2/2/25 1107)   tranexamic acid in NaCl,iso-os IVPB 1,000 mg (0 mg Intravenous Stopped 2/2/25 1102)   diphenhydrAMINE injection 25 mg (25 mg Intravenous Given 2/2/25 1044)   ondansetron injection 4 mg (4 mg Intravenous Given 2/2/25 1105)     Medical Decision Making  DDX --airway obstruction, angioedema, Ramírez's, anaphylaxis, allergic reaction    Patient was treated with TXA, Benadryl, fluids, Solu-Medrol for intrinsic angioedema.  Patient has had episodes like this throughout the last 13 years  There was no oral swelling at discharge no stridor no respiratory distress  Vital signs stable at discharge.  Patient instructed to continue to follow-up with allergist/specialist  Patient stable at time of discharge in no acute distress.  No life-threatening illnesses were found during ER visit today.  Patient was instructed to follow-up with PCP or other recommended specialist within the next 48-72 hours.  Patient was instructed to return to ER immediately for any worsening or concerning symptoms.  All discharge instructions discussed with patient, and patient agrees to comply with discharge instructions given today.         Risk  Prescription drug management.                                      Clinical Impression:  Final diagnoses:  [T78.3XXA] Angioedema, initial encounter (Primary)          ED Disposition Condition    Discharge Stable          ED Prescriptions    None       Follow-up Information    None          Yusuf Pink, LORE  02/02/25 [id number]

## 2025-02-04 ENCOUNTER — TELEPHONE (OUTPATIENT)
Dept: ALLERGY | Facility: CLINIC | Age: 55
End: 2025-02-04
Payer: COMMERCIAL

## 2025-02-04 NOTE — TELEPHONE ENCOUNTER
"Returned patient's call. Pt stated that she needs a sooner appointment than the 12th because she is constantly swelling and has had to go to the ED last Sunday.  Offered appointment today at 2:30, pt declined stating that she wants her  to go. Offered 5 different appointment times tomorrow 2/5/25, pt declined stating, "you know what, Friday's really work best for my ".  Informed patient that we do not have any appointment open this Friday.  Pt then stated that she will keep the 12th but if she swells again she will call us back.  "

## 2025-02-04 NOTE — TELEPHONE ENCOUNTER
----- Message from LIEN Perdomo MD sent at 2/3/2025  2:20 PM CST -----  Regarding: RE: appt access  Contact: [phone]  Stefany, Why would she send this to me?  ----- Message -----  From: Kallie Reynaga MA  Sent: 2/3/2025   1:21 PM CST  To: LIEN Perdomo III, MD  Subject: FW: appt access                                    ----- Message -----  From: Beckie Pierson RN  Sent: 2/3/2025   9:33 AM CST  To: Kallie Reynaga MA  Subject: FW: appt access                                    ----- Message -----  From: Ange Brizuela  Sent: 2/3/2025   9:10 AM CST  To: Conor Duenas  Subject: appt access                                      Patient calling to request sooner appt . Pls call [phone]

## 2025-02-12 ENCOUNTER — OFFICE VISIT (OUTPATIENT)
Dept: ALLERGY | Facility: CLINIC | Age: 55
End: 2025-02-12
Payer: COMMERCIAL

## 2025-02-12 ENCOUNTER — LAB VISIT (OUTPATIENT)
Dept: LAB | Facility: HOSPITAL | Age: 55
End: 2025-02-12
Payer: COMMERCIAL

## 2025-02-12 VITALS
WEIGHT: 147.25 LBS | HEIGHT: 61 IN | SYSTOLIC BLOOD PRESSURE: 130 MMHG | DIASTOLIC BLOOD PRESSURE: 63 MMHG | BODY MASS INDEX: 27.8 KG/M2

## 2025-02-12 DIAGNOSIS — L50.1 IDIOPATHIC URTICARIA: ICD-10-CM

## 2025-02-12 DIAGNOSIS — T78.3XXD ANGIOEDEMA, SUBSEQUENT ENCOUNTER: ICD-10-CM

## 2025-02-12 DIAGNOSIS — K21.9 GASTROESOPHAGEAL REFLUX DISEASE, UNSPECIFIED WHETHER ESOPHAGITIS PRESENT: ICD-10-CM

## 2025-02-12 DIAGNOSIS — E03.9 HYPOTHYROIDISM, UNSPECIFIED TYPE: ICD-10-CM

## 2025-02-12 DIAGNOSIS — Z86.69 HISTORY OF MIGRAINE HEADACHES: ICD-10-CM

## 2025-02-12 DIAGNOSIS — T78.3XXD ANGIOEDEMA, SUBSEQUENT ENCOUNTER: Primary | ICD-10-CM

## 2025-02-12 LAB
25(OH)D3+25(OH)D2 SERPL-MCNC: 30 NG/ML (ref 30–96)
C3 SERPL-MCNC: 121 MG/DL (ref 50–180)
C4 SERPL-MCNC: 28 MG/DL (ref 11–44)
THYROGLOB AB SERPL IA-ACNC: <4 IU/ML (ref 0–3.9)
THYROPEROXIDASE IGG SERPL-ACNC: <6 IU/ML
TSH SERPL DL<=0.005 MIU/L-ACNC: 0.42 UIU/ML (ref 0.4–4)

## 2025-02-12 PROCEDURE — 86161 COMPLEMENT/FUNCTION ACTIVITY: CPT | Performed by: ALLERGY & IMMUNOLOGY

## 2025-02-12 PROCEDURE — 84165 PROTEIN E-PHORESIS SERUM: CPT | Performed by: ALLERGY & IMMUNOLOGY

## 2025-02-12 PROCEDURE — 36415 COLL VENOUS BLD VENIPUNCTURE: CPT | Performed by: ALLERGY & IMMUNOLOGY

## 2025-02-12 PROCEDURE — 88184 FLOWCYTOMETRY/ TC 1 MARKER: CPT | Performed by: ALLERGY & IMMUNOLOGY

## 2025-02-12 PROCEDURE — 83520 IMMUNOASSAY QUANT NOS NONAB: CPT | Performed by: ALLERGY & IMMUNOLOGY

## 2025-02-12 PROCEDURE — 86800 THYROGLOBULIN ANTIBODY: CPT | Performed by: ALLERGY & IMMUNOLOGY

## 2025-02-12 PROCEDURE — 3078F DIAST BP <80 MM HG: CPT | Mod: CPTII,S$GLB,, | Performed by: ALLERGY & IMMUNOLOGY

## 2025-02-12 PROCEDURE — 99214 OFFICE O/P EST MOD 30 MIN: CPT | Mod: S$GLB,,, | Performed by: ALLERGY & IMMUNOLOGY

## 2025-02-12 PROCEDURE — 3008F BODY MASS INDEX DOCD: CPT | Mod: CPTII,S$GLB,, | Performed by: ALLERGY & IMMUNOLOGY

## 2025-02-12 PROCEDURE — 3075F SYST BP GE 130 - 139MM HG: CPT | Mod: CPTII,S$GLB,, | Performed by: ALLERGY & IMMUNOLOGY

## 2025-02-12 PROCEDURE — 82306 VITAMIN D 25 HYDROXY: CPT | Performed by: ALLERGY & IMMUNOLOGY

## 2025-02-12 PROCEDURE — 1160F RVW MEDS BY RX/DR IN RCRD: CPT | Mod: CPTII,S$GLB,, | Performed by: ALLERGY & IMMUNOLOGY

## 2025-02-12 PROCEDURE — 86376 MICROSOMAL ANTIBODY EACH: CPT | Performed by: ALLERGY & IMMUNOLOGY

## 2025-02-12 PROCEDURE — 84443 ASSAY THYROID STIM HORMONE: CPT | Performed by: ALLERGY & IMMUNOLOGY

## 2025-02-12 PROCEDURE — 86162 COMPLEMENT TOTAL (CH50): CPT | Performed by: ALLERGY & IMMUNOLOGY

## 2025-02-12 PROCEDURE — 1159F MED LIST DOCD IN RCRD: CPT | Mod: CPTII,S$GLB,, | Performed by: ALLERGY & IMMUNOLOGY

## 2025-02-12 PROCEDURE — 86160 COMPLEMENT ANTIGEN: CPT | Mod: 59 | Performed by: ALLERGY & IMMUNOLOGY

## 2025-02-12 PROCEDURE — 99999 PR PBB SHADOW E&M-EST. PATIENT-LVL III: CPT | Mod: PBBFAC,,, | Performed by: ALLERGY & IMMUNOLOGY

## 2025-02-12 PROCEDURE — 86160 COMPLEMENT ANTIGEN: CPT | Performed by: ALLERGY & IMMUNOLOGY

## 2025-02-12 RX ORDER — LEVOTHYROXINE, LIOTHYRONINE 38; 9 UG/1; UG/1
60 TABLET ORAL
COMMUNITY

## 2025-02-12 RX ORDER — PANTOPRAZOLE SODIUM 40 MG/1
1 TABLET, DELAYED RELEASE ORAL DAILY
COMMUNITY

## 2025-02-12 RX ORDER — SCOPOLAMINE 1 MG/3D
PATCH, EXTENDED RELEASE TRANSDERMAL
COMMUNITY

## 2025-02-12 RX ORDER — PREDNISOLONE ACETATE 10 MG/ML
SUSPENSION/ DROPS OPHTHALMIC
COMMUNITY

## 2025-02-12 RX ORDER — OXYBUTYNIN CHLORIDE 5 MG/1
1 TABLET ORAL 2 TIMES DAILY
COMMUNITY
Start: 2024-10-04

## 2025-02-12 RX ORDER — PROPRANOLOL HYDROCHLORIDE 10 MG/1
TABLET ORAL
COMMUNITY

## 2025-02-12 NOTE — PROGRESS NOTES
Sagrario Almonte returns to clinic today for continued evaluation of recurrent angioedema.  She was last seen April 20, 2023.  She is here with her  Santhosh.  They are from [place], [place].    After her last visit, she was approved for Xolair but never started.  She says that she was not able to get an appointment to begin.     She did better after this visit for awhile.  She continued to take hydroxyzine 50 milligrams a day.  She would occasionally need another one.     She also occasionally took Benadryl.    In early January she had the flu and did not feel well for several weeks. Those symptoms have resolved.     Afterwards, she started having increased episodes of angioedema with almost daily swelling. This would be of her eyelid, face, or tongue.    On February 2, 2025 she had the sensation of throat closing.  She was seen in the [facility] [place] emergency room and given methylprednisolone IM, diphenhydramine, and famotidine.    She was also given tranexamic acid with an improvement in her angioedema.    The emergency room physician says that she had had this once before with good results.    She denies any urticaria.     She denies any family history of urticaria or angioedema.     She does have hypothyroidism and takes replacement.  Her recent TSH was normal.    She no longer has IBS.  Her reflux is controlled with Protonix.     She does have fibromyalgia.    Her migraines have been controlled.    She denies any rhinitis or conjunctivitis. She denies any cough, wheezing, or shortness of breath.    Physical Examination:  General: Well-developed, well-nourished, no acute distress.  Head: No sinus tenderness.  Eyes: Conjunctivae:  No bulbar or palpebral conjunctival injection.  Ears: EAC's clear.  TM's clear.  No pre-auricular nodes.  Nose: Nasal Mucosa:  Pink.  Septum: No apparent deviation.  Turbinates:  No significant edema.  Polyps/Mass:  None visible.  Teeth/Gums:  No bleeding noted.  Oropharynx: No  exudates.  Neck: Supple without thyromegaly. No cervical lymphadenopathy.    Respiratory/Chest: Effort: Good.  Auscultation:  Clear bilaterally.  Skin: Good turgor.  No urticaria or angioedema.  Neuro/Psych: Oriented x 3.    Laboratory 6/7/2017:  ImmunoCAP: Negative.  C1 esterase inhibitor level: 25.  C1 esterase inhibitor functional: Greater than 90.  C3: 112.  C4: 24.  TSH: 1.194.  Thyroid peroxidase antibody level: Less than 6.0.  Thyroglobulin antibody level: Less than 4.0.  Vitamin D level: 36.  Anti-IgE receptor antibody level: 1.  Serum tryptase: 3.0.  CBC: Normal.  CMP: Glucose 55.  SPEP: Normal.    Food skin tests prick #60 6/22/2017:  3+ histamine control. All tests are negative.    Laboratory 01/15/2021:   CMP:  CO2 22.   CBC:  Normal.   Lipid panel:  Cholesterol 200.   TSH:  6.495.   Free T4 0.88.   Hemoglobin A1c:  4.9.    Laboratory 06/17/2021:   TSH:  3.850.    Assessment:  1.  Chronic recurrent urticaria and angioedema, probably idiopathic.  2.  Hypothyroidism on replacement.  3.  GERD.  4.  Fibromyalgia.  5.  Migraine headaches.  6.  S/P right internal carotid aneurysm repair September 2006.  7.  Increasing memory difficulty.    Recommendations:  1. Continue hydroxyzine 50 milligrams at night.  2. Add Zyrtec 10 milligrams a day. She may take up to four day if needed.  3. Start Xolair.  4. EpiPen refilled.   5. Return to clinic in 2 to 3 months or sooner if needed.    Patient education June 22, 2019:    Allergic mechanisms and treatment options were reviewed in detail. Urticaria and angioedema were reviewed.

## 2025-02-13 LAB
ALBUMIN SERPL ELPH-MCNC: 3.99 G/DL (ref 3.35–5.55)
ALPHA1 GLOB SERPL ELPH-MCNC: 0.3 G/DL (ref 0.17–0.41)
ALPHA2 GLOB SERPL ELPH-MCNC: 0.79 G/DL (ref 0.43–0.99)
B-GLOBULIN SERPL ELPH-MCNC: 0.72 G/DL (ref 0.5–1.1)
GAMMA GLOB SERPL ELPH-MCNC: 0.79 G/DL (ref 0.67–1.58)
PROT SERPL-MCNC: 6.6 G/DL (ref 6–8.4)

## 2025-02-14 LAB — TRYPTASE LEVEL: 4.9 NG/ML

## 2025-02-16 LAB — PATHOLOGIST INTERPRETATION SPE: NORMAL

## 2025-02-17 LAB
C1INH FUNCTIONAL/C1INH TOTAL MFR SERPL: >100 %
CH50 SERPL-ACNC: 78 U/ML (ref 42–95)

## 2025-02-18 LAB — C1INH SERPL-MCNC: 22 MG/DL (ref 21–39)

## 2025-02-25 ENCOUNTER — PATIENT MESSAGE (OUTPATIENT)
Dept: ALLERGY | Facility: CLINIC | Age: 55
End: 2025-02-25
Payer: COMMERCIAL

## 2025-02-27 ENCOUNTER — PATIENT MESSAGE (OUTPATIENT)
Dept: ALLERGY | Facility: CLINIC | Age: 55
End: 2025-02-27
Payer: COMMERCIAL

## 2025-02-27 ENCOUNTER — TELEPHONE (OUTPATIENT)
Dept: ALLERGY | Facility: CLINIC | Age: 55
End: 2025-02-27
Payer: COMMERCIAL

## 2025-02-27 DIAGNOSIS — L50.1 IDIOPATHIC URTICARIA: Primary | ICD-10-CM

## 2025-03-06 ENCOUNTER — TELEPHONE (OUTPATIENT)
Dept: ALLERGY | Facility: CLINIC | Age: 55
End: 2025-03-06
Payer: COMMERCIAL

## 2025-03-06 NOTE — TELEPHONE ENCOUNTER
Faxed additional clinical information to insurance plan for B&B authorization.    Ishan Green, PharmD  Clinical Pharmacist - Allergy/Pulmonary

## 2025-03-06 NOTE — TELEPHONE ENCOUNTER
I contacted Ms. Almonte to give her an update on the approval process for Xolair.      I informed her that it was approved under her pharmacy benefits but was denied under her medical benefits for in-clinic administration due to 'no indication that the patient has/had urticaria with symptoms present for greater than 3 days per week for greater than 6 weeks despite daily non-sedating H1 antihistamine therapy with doses titrated up to a max of four times the standard dose.'    Ms. Almonte attests that she has tried Dr. Perdomo's recommendation of taking Zyrtec 10 mg 4 times daily in addition to hydroxyzine 50 mg daily in the recent past with little to no symptom relief.      She states that since she is unable to tolerate the drying effects she experiences when taking 40 mg zyrtec daily and does not experience additional/adequate symptom relief even at that dose, she currently takes zyrtec 10 mg and hydroxyzine 50 mg daily.    Ishan Green, PharmD  Clinical Pharmacist - Allergy/Pulmonary

## 2025-03-18 ENCOUNTER — PATIENT MESSAGE (OUTPATIENT)
Dept: ALLERGY | Facility: CLINIC | Age: 55
End: 2025-03-18
Payer: COMMERCIAL

## 2025-03-18 NOTE — TELEPHONE ENCOUNTER
Pt. Was notified that the pharmacist Ishan has a peer to peer scheduled for Thursday morning 3/20/25, and someone will call her and inform her of the out come of the peer to peer.

## 2025-03-20 ENCOUNTER — TELEPHONE (OUTPATIENT)
Dept: ALLERGY | Facility: CLINIC | Age: 55
End: 2025-03-20
Payer: COMMERCIAL

## 2025-03-20 NOTE — TELEPHONE ENCOUNTER
Peer to peer for Xolair completed with Dr. LANDERS--approved.  Waiting on approval letter to be received.    Contacted Ms. Almonte to make her aware of this and to let her know that I will keep her updated with the next steps.    Ishan Green, PharmD  Clinical Pharmacist - Allergy/Pulmonary

## 2025-03-25 ENCOUNTER — TELEPHONE (OUTPATIENT)
Dept: ALLERGY | Facility: CLINIC | Age: 55
End: 2025-03-25
Payer: COMMERCIAL

## 2025-03-25 NOTE — TELEPHONE ENCOUNTER
[place] [facility] [place].    Thank you,  Magdalena

## 2025-03-27 ENCOUNTER — TELEPHONE (OUTPATIENT)
Dept: ALLERGY | Facility: CLINIC | Age: 55
End: 2025-03-27
Payer: COMMERCIAL

## 2025-03-27 NOTE — TELEPHONE ENCOUNTER
Contacted Ms. Almonte to relay the information I received from [employer] about her estimated cost for in office Xolair administration.    She was able to sign up for the Xolair Co-Pay program and gave me the copay card information.    I am forwarding this information to the correct department for her so it can be applied for her upcoming first injection that is scheduled 4/4.    Ishan Green, PharmD  Clinical Pharmacist - Allergy/Pulmonary

## 2025-04-01 ENCOUNTER — TELEPHONE (OUTPATIENT)
Dept: ALLERGY | Facility: CLINIC | Age: 55
End: 2025-04-01
Payer: COMMERCIAL

## 2025-04-01 NOTE — TELEPHONE ENCOUNTER
I contacted Ms. Almonte to let her know that I was able to forward her  copay card information to the Drug Cost Assistance department (sent to [place] [place]) and she was unable to inform me what the cost would be after the card is applied since it happens after the insurance billing process.    I explained to Ms. Almonte that the Xolair copay card covers up to $15,000 annually and she was approved for this that I am confident that she will not have to pay the full copay from her insurance quote.    She expressed understanding and states she will be here for her injection appointment on Friday.    Ishan Green, PharmD  Clinical Pharmacist - Allergy/Pulmonary

## 2025-04-02 ENCOUNTER — DOCUMENTATION ONLY (OUTPATIENT)
Dept: ALLERGY | Facility: CLINIC | Age: 55
End: 2025-04-02
Payer: COMMERCIAL

## 2025-04-02 NOTE — PROGRESS NOTES
Per fax from [employer], Xolair has been approved.   Auth Dates 3/3/2025-9/3/2025  Auth # [id number]    Approval Letter sent to scanning.

## 2025-04-04 ENCOUNTER — PATIENT MESSAGE (OUTPATIENT)
Dept: ALLERGY | Facility: CLINIC | Age: 55
End: 2025-04-04

## 2025-04-04 ENCOUNTER — CLINICAL SUPPORT (OUTPATIENT)
Dept: ALLERGY | Facility: CLINIC | Age: 55
End: 2025-04-04
Payer: COMMERCIAL

## 2025-04-04 DIAGNOSIS — T78.3XXD ANGIOEDEMA, SUBSEQUENT ENCOUNTER: Primary | ICD-10-CM

## 2025-04-04 DIAGNOSIS — L50.1 IDIOPATHIC URTICARIA: ICD-10-CM

## 2025-04-04 DIAGNOSIS — L50.1 IDIOPATHIC URTICARIA: Primary | ICD-10-CM

## 2025-04-04 PROCEDURE — 99999 PR PBB SHADOW E&M-EST. PATIENT-LVL I: CPT | Mod: PBBFAC,,,

## 2025-04-04 NOTE — TELEPHONE ENCOUNTER
Called patient back regarding swelling. She stated that it was like her normal swelling. Asked patient is she was taking an antihistamine which she denied. She also has not been taking her hydryoxzine. Dr. Perdomo notified and instructed for patient to continue hydroxyzine and to start taking zyrtec as recommended. Patient also informed that she will have a 2hr wait at her next visit.

## 2025-04-04 NOTE — PROGRESS NOTES
Xolair BB, 1st injection  Patient waited 30 minutes, no reaction noted  Auth: [id number], exp 9/3/25

## 2025-04-28 RX ORDER — HYDROXYZINE HYDROCHLORIDE 25 MG/1
25 TABLET, FILM COATED ORAL
Qty: 60 TABLET | Refills: 0 | Status: SHIPPED | OUTPATIENT
Start: 2025-04-28

## 2025-05-02 ENCOUNTER — CLINICAL SUPPORT (OUTPATIENT)
Dept: ALLERGY | Facility: CLINIC | Age: 55
End: 2025-05-02
Payer: COMMERCIAL

## 2025-05-02 DIAGNOSIS — T78.3XXD ANGIOEDEMA, SUBSEQUENT ENCOUNTER: Primary | ICD-10-CM

## 2025-05-02 NOTE — PROGRESS NOTES
Xolair 2nd. Injection was given    Xolair BB--AUTH#[id number], exp.9/3/25    SQ-LT UPPER ARM  XOLAIR 150 MG injection given, tolerated well.   SQ-RT UPPER ARM  XOLAIR 150 MG injection given, tolerated well.  30 MINUTES into injection states eyes feel like they are swelling, but not swollen and flushing to face  Zyrtec 10 ml was given    Pt. Had a 2 hour wait, and was checked every 30 minutes  No increase in feeling of facial swelling, and face flushing is gone   was notified, and stated pt. Is to have a 2 hour wait time with next visit.   See MAR for administration.

## 2025-05-30 ENCOUNTER — CLINICAL SUPPORT (OUTPATIENT)
Dept: ALLERGY | Facility: CLINIC | Age: 55
End: 2025-05-30
Payer: COMMERCIAL

## 2025-05-30 DIAGNOSIS — T78.3XXD ANGIOEDEMA, SUBSEQUENT ENCOUNTER: Primary | ICD-10-CM

## 2025-05-30 DIAGNOSIS — L50.1 IDIOPATHIC URTICARIA: ICD-10-CM

## 2025-06-10 ENCOUNTER — TELEPHONE (OUTPATIENT)
Dept: ALLERGY | Facility: CLINIC | Age: 55
End: 2025-06-10
Payer: COMMERCIAL

## 2025-06-10 DIAGNOSIS — L50.1 IDIOPATHIC URTICARIA: Primary | ICD-10-CM

## 2025-06-10 RX ORDER — OMALIZUMAB 300 MG/2ML
300 INJECTION, SOLUTION SUBCUTANEOUS
Qty: 2 ML | Refills: 12 | Status: ACTIVE | OUTPATIENT
Start: 2025-06-10

## 2025-06-13 ENCOUNTER — TELEPHONE (OUTPATIENT)
Dept: ALLERGY | Facility: CLINIC | Age: 55
End: 2025-06-13
Payer: COMMERCIAL

## 2025-06-13 DIAGNOSIS — Z29.9 UNSPECIFIED PROPHYLACTIC OR TREATMENT MEASURE: Primary | ICD-10-CM

## 2025-06-13 RX ORDER — EPINEPHRINE 0.3 MG/.3ML
1 INJECTION SUBCUTANEOUS
Qty: 2 EACH | Refills: 1 | Status: SHIPPED | OUTPATIENT
Start: 2025-06-13

## 2025-06-13 NOTE — TELEPHONE ENCOUNTER
EpiPen Rx entered and routed to Dr. Perdomo for cosignature.    Ishan Green, PharmD  Clinical Pharmacist - Allergy/Pulmonary

## 2025-06-13 NOTE — TELEPHONE ENCOUNTER
----- Message from Pharmacist Marcella sent at 6/13/2025  3:35 PM CDT -----  Regarding: Epipen  Good afternoon Dr. Perdomo and Staff,    We updated the prior authorization for this patient's Xolair and it was approved through 12/9/2025, but we are out of network with her insurance company so her prescription has been routed to [facility]. The patient states that she doesn't have an epipen. Can you send an epipen prescription to her preferred pharmacy listed below?    Preferred Pharmacy: [facility] [address] [place] [id number]    Thanks,  Marcella Rosas  Clinical Pharmacist  [facility]  [address] [facility], [address]  Phone: [phone]  Fax: [phone]

## 2025-06-17 RX ORDER — EPINEPHRINE 0.3 MG/.3ML
1 INJECTION SUBCUTANEOUS ONCE
Qty: 1 EACH | Refills: 0 | Status: SHIPPED | OUTPATIENT
Start: 2025-06-17 | End: 2025-06-17

## 2025-06-23 ENCOUNTER — TELEPHONE (OUTPATIENT)
Dept: ALLERGY | Facility: CLINIC | Age: 55
End: 2025-06-23
Payer: COMMERCIAL

## 2025-06-23 NOTE — TELEPHONE ENCOUNTER
----- Message from Jeanie sent at 6/22/2025  7:45 PM CDT -----  Regarding: CANNOT ACCESS REFERRAL  Hello,    I am not able to get in the referral to submit auth. Can you please create a new referral for this patient?

## 2025-06-25 ENCOUNTER — PATIENT MESSAGE (OUTPATIENT)
Dept: ALLERGY | Facility: CLINIC | Age: 55
End: 2025-06-25
Payer: COMMERCIAL

## 2025-06-27 ENCOUNTER — CLINICAL SUPPORT (OUTPATIENT)
Dept: ALLERGY | Facility: CLINIC | Age: 55
End: 2025-06-27
Payer: COMMERCIAL

## 2025-06-27 DIAGNOSIS — L50.1 CHRONIC IDIOPATHIC URTICARIA: Primary | ICD-10-CM

## 2025-06-27 NOTE — TELEPHONE ENCOUNTER
I contacted Ms. Almonte in regards to her message regarding the Xolair  copay assistance.  Patient states her account shows a large balance for the injections.  I contacted the billing dept and will follow up on this.    I will also give the coupon info to [facility] so she can begin home injection for her next dose.    Ishan Green, PharmD  Clinical Pharmacist - Allergy/Pulmonary

## 2025-07-01 ENCOUNTER — PATIENT MESSAGE (OUTPATIENT)
Dept: ALLERGY | Facility: CLINIC | Age: 55
End: 2025-07-01
Payer: COMMERCIAL

## 2025-07-01 NOTE — TELEPHONE ENCOUNTER
Spoke to pharmacist Reagan with [facility] to authorize Xolair for home administration for Ms. Almonte.      Additionally, Reagan states that he is not sure why the [facility] representative allowed a shipment to be scheduled for delivery to our clinic without confirmation from someone in our office, but will look into the issue to prevent it from happening in the future.    Ishan Green, PharmD  Clinical Pharmacist - Allergy/Pulmonary

## 2025-07-10 DIAGNOSIS — L50.1 CHRONIC IDIOPATHIC URTICARIA: Primary | ICD-10-CM

## 2025-07-11 RX ORDER — HYDROXYZINE HYDROCHLORIDE 25 MG/1
25 TABLET, FILM COATED ORAL
Qty: 90 TABLET | Refills: 0 | Status: SHIPPED | OUTPATIENT
Start: 2025-07-11

## 2025-07-22 ENCOUNTER — HOSPITAL ENCOUNTER (OUTPATIENT)
Dept: RADIOLOGY | Facility: HOSPITAL | Age: 55
Discharge: HOME | End: 2025-07-22
Attending: NURSE PRACTITIONER
Payer: COMMERCIAL

## 2025-07-22 DIAGNOSIS — M79.641 PAIN IN RIGHT HAND: ICD-10-CM

## 2025-07-22 PROCEDURE — 73130 X-RAY EXAM OF HAND: CPT | Mod: TC,RT

## 2025-07-22 PROCEDURE — 73130 X-RAY EXAM OF HAND: CPT | Mod: 26,RT,, | Performed by: RADIOLOGY

## 2025-08-14 ENCOUNTER — HOSPITAL ENCOUNTER (OUTPATIENT)
Dept: RADIOLOGY | Facility: HOSPITAL | Age: 55
Discharge: HOME | End: 2025-08-14
Attending: NURSE PRACTITIONER
Payer: COMMERCIAL

## 2025-08-14 VITALS — WEIGHT: 147 LBS | HEIGHT: 61 IN | BODY MASS INDEX: 27.75 KG/M2

## 2025-08-14 DIAGNOSIS — Z12.31 ENCOUNTER FOR SCREENING MAMMOGRAM FOR BREAST CANCER: ICD-10-CM

## 2025-08-14 PROCEDURE — 77067 SCR MAMMO BI INCL CAD: CPT | Mod: 26,,, | Performed by: RADIOLOGY

## 2025-08-14 PROCEDURE — 77063 BREAST TOMOSYNTHESIS BI: CPT | Mod: TC

## 2025-08-14 PROCEDURE — 77063 BREAST TOMOSYNTHESIS BI: CPT | Mod: 26,,, | Performed by: RADIOLOGY
